# Patient Record
Sex: FEMALE | Race: WHITE | NOT HISPANIC OR LATINO | Employment: FULL TIME | URBAN - METROPOLITAN AREA
[De-identification: names, ages, dates, MRNs, and addresses within clinical notes are randomized per-mention and may not be internally consistent; named-entity substitution may affect disease eponyms.]

---

## 2022-01-08 ENCOUNTER — OFFICE VISIT (OUTPATIENT)
Dept: URGENT CARE | Facility: PHYSICIAN GROUP | Age: 52
End: 2022-01-08

## 2022-01-08 VITALS
OXYGEN SATURATION: 97 % | RESPIRATION RATE: 14 BRPM | HEART RATE: 64 BPM | HEIGHT: 62 IN | SYSTOLIC BLOOD PRESSURE: 112 MMHG | WEIGHT: 170 LBS | TEMPERATURE: 98.3 F | DIASTOLIC BLOOD PRESSURE: 88 MMHG | BODY MASS INDEX: 31.28 KG/M2

## 2022-01-08 DIAGNOSIS — G62.9 NEUROPATHY: ICD-10-CM

## 2022-01-08 PROCEDURE — 99203 OFFICE O/P NEW LOW 30 MIN: CPT | Performed by: FAMILY MEDICINE

## 2022-01-08 RX ORDER — GABAPENTIN 300 MG/1
300 CAPSULE ORAL 3 TIMES DAILY
Qty: 90 CAPSULE | Refills: 2 | Status: SHIPPED | OUTPATIENT
Start: 2022-01-08 | End: 2022-02-08

## 2022-01-08 ASSESSMENT — ENCOUNTER SYMPTOMS
NAUSEA: 0
WEIGHT LOSS: 0
EYE REDNESS: 0
EYE DISCHARGE: 0
VOMITING: 0
MYALGIAS: 0

## 2022-01-08 NOTE — PROGRESS NOTES
"Subjective     Bobbi Guerra is a 51 y.o. female who presents with Arm Pain (both arms, numbness, can not grasp things without having pain x 2 wks )            2 weeks burning pain bilateral upper extremity. Seems to be most prominent 1am to 6am. No change with head position. No fever. No PMH DM. No trauma. Pain is intermittent, 10/10 severity with duration approx 1 hour. No weakness. No EtOH. No relief with traditional [facility] medication. No other aggravating or alleviating factors.        Review of Systems   Constitutional: Negative for malaise/fatigue and weight loss.   Eyes: Negative for discharge and redness.   Gastrointestinal: Negative for nausea and vomiting.   Musculoskeletal: Negative for joint pain and myalgias.   Skin: Negative for itching and rash.              Objective     /88 (BP Location: Left arm, Patient Position: Sitting, BP Cuff Size: Adult)   Pulse 64   Temp 36.8 °C (98.3 °F) (Temporal)   Resp 14   Ht 1.575 m (5' 2\")   Wt 77.1 kg (170 lb)   SpO2 97%   BMI 31.09 kg/m²      Physical Exam  Constitutional:       General: She is not in acute distress.     Appearance: She is well-developed.   HENT:      Head: Normocephalic and atraumatic.   Eyes:      Conjunctiva/sclera: Conjunctivae normal.   Cardiovascular:      Rate and Rhythm: Normal rate and regular rhythm.      Heart sounds: Normal heart sounds. No murmur heard.      Pulmonary:      Effort: Pulmonary effort is normal.      Breath sounds: Normal breath sounds. No wheezing.   Skin:     General: Skin is warm and dry.      Findings: No rash.   Neurological:      General: No focal deficit present.      Mental Status: She is alert and oriented to person, place, and time.      Comments: Sensory to light and sharp intact bilateral upper extremity. Motor intact. Vascular intact. No change in radial pulse with shoulder extension/rotation.                              Assessment & Plan         1. Neuropathy  gabapentin (NEURONTIN) 300 " MG Cap    Basic Metabolic Panel    VITAMIN B12    TSH WITH REFLEX TO FT4     Differential diagnosis, natural history, supportive care, and indications for immediate follow-up discussed at length.     Unclear etiology, f/u studies.    F/u primary care

## 2022-01-24 ENCOUNTER — TELEPHONE (OUTPATIENT)
Dept: URGENT CARE | Facility: PHYSICIAN GROUP | Age: 52
End: 2022-01-24

## 2022-01-26 ENCOUNTER — TELEPHONE (OUTPATIENT)
Dept: URGENT CARE | Facility: PHYSICIAN GROUP | Age: 52
End: 2022-01-26

## 2022-01-26 NOTE — TELEPHONE ENCOUNTER
Patient called with results from [facility] labs.  Vitamin B12 within normal limits.  TSH within normal limits.  BMP within normal limits except for slightly low creatinine.    She notes that symptoms have been improving recently.  If they recur or worsen again she will follow-up with primary care.

## 2022-02-08 ENCOUNTER — OFFICE VISIT (OUTPATIENT)
Dept: INTERNAL MEDICINE | Facility: OTHER | Age: 52
End: 2022-02-08

## 2022-02-08 VITALS
HEART RATE: 69 BPM | DIASTOLIC BLOOD PRESSURE: 95 MMHG | TEMPERATURE: 97.4 F | OXYGEN SATURATION: 96 % | BODY MASS INDEX: 33.49 KG/M2 | WEIGHT: 177.4 LBS | SYSTOLIC BLOOD PRESSURE: 155 MMHG | HEIGHT: 61 IN

## 2022-02-08 DIAGNOSIS — I10 PRIMARY HYPERTENSION: ICD-10-CM

## 2022-02-08 DIAGNOSIS — E66.9 OBESITY (BMI 30-39.9): ICD-10-CM

## 2022-02-08 DIAGNOSIS — M25.641 STIFFNESS OF JOINTS OF BOTH HANDS: ICD-10-CM

## 2022-02-08 DIAGNOSIS — M79.10 MYALGIA: ICD-10-CM

## 2022-02-08 DIAGNOSIS — M25.642 STIFFNESS OF JOINTS OF BOTH HANDS: ICD-10-CM

## 2022-02-08 PROCEDURE — 99204 OFFICE O/P NEW MOD 45 MIN: CPT | Mod: GC | Performed by: STUDENT IN AN ORGANIZED HEALTH CARE EDUCATION/TRAINING PROGRAM

## 2022-02-08 ASSESSMENT — ENCOUNTER SYMPTOMS
VOMITING: 0
SHORTNESS OF BREATH: 0
PALPITATIONS: 0
MYALGIAS: 0
BACK PAIN: 0
BLOOD IN STOOL: 0
BLURRED VISION: 0
PHOTOPHOBIA: 0
SPUTUM PRODUCTION: 0
WEIGHT LOSS: 0
COUGH: 0
FOCAL WEAKNESS: 0
DOUBLE VISION: 0
TINGLING: 0
BRUISES/BLEEDS EASILY: 0
SORE THROAT: 0
DEPRESSION: 0
DIZZINESS: 0
FEVER: 0
HEMOPTYSIS: 0
SINUS PAIN: 0
CHILLS: 0
NECK PAIN: 0
ABDOMINAL PAIN: 0
NERVOUS/ANXIOUS: 0
ORTHOPNEA: 0
NAUSEA: 0
WEAKNESS: 0
HEARTBURN: 0
HEADACHES: 0

## 2022-02-08 ASSESSMENT — PATIENT HEALTH QUESTIONNAIRE - PHQ9: CLINICAL INTERPRETATION OF PHQ2 SCORE: 0

## 2022-02-08 ASSESSMENT — LIFESTYLE VARIABLES: SUBSTANCE_ABUSE: 0

## 2022-02-08 NOTE — PROGRESS NOTES
New Patient    Bobbi Guerra is a 51 y.o. female who presents today with the following:    CC: Establish Care with Primary Care Physician     HPI:    Patient here to establish care with me.  She has known hypertension diagnosed about a year ago in the [facility] currently on Ibersartan but unsure of dose.  She just moved to [place] in November 2021 from the [facility].  Patient was seen at an  last month for bilateral hand pain and stiffness she had a normal BMP, TSH, FT4 and B12 levels. She was prescribed Gabapentin but has not had any improvement in symptoms.  Patient stated that she started experiencing morning joint stiffness and  Hand pain for the past 2 months, never had this problem before, no history of RA in her family.  Stiffness worse in early mornings, affected joints usually proxinal interphalangeal, but not MCP joints, pain usually gets better after couple hours, she denied any swollen joints, fever, chills.  She has also had similar pain at night after she falls asleep, and some times has had bilateral upper arm myalgias, but not big joint pain or stiffness.  Patient has also taken Tylenol in the past 4 days with very slight improvement in pain.  She is up to date with immunizations, but is due for mammogram, Pap smear and Colonoscopy, unfortunately she does not have Insurance and can not afford getting above screening.  She was employed recently and will find out if she can get Insurance soon, she will let me know in her following appointment.  She was vaccinated for Covid in the [facility] and also [place] in the [place].      ROS:       Review of Systems   Constitutional: Negative for chills, fever, malaise/fatigue and weight loss.   HENT: Negative for congestion, hearing loss, sinus pain, sore throat and tinnitus.    Eyes: Negative for blurred vision, double vision and photophobia.   Respiratory: Negative for cough, hemoptysis, sputum production and shortness of breath.    Cardiovascular:  "Negative for chest pain, palpitations, orthopnea and leg swelling.   Gastrointestinal: Negative for abdominal pain, blood in stool, heartburn, melena, nausea and vomiting.   Genitourinary: Negative for dysuria, frequency, hematuria and urgency.   Musculoskeletal: Negative for back pain, joint pain, myalgias and neck pain.   Skin: Negative for rash.   Neurological: Negative for dizziness, tingling, focal weakness, weakness and headaches.   Endo/Heme/Allergies: Does not bruise/bleed easily.   Psychiatric/Behavioral: Negative for depression, substance abuse and suicidal ideas. The patient is not nervous/anxious.          Past Medical History:   Diagnosis Date   • Hypertension     Diagnosed in 2021       History reviewed. No pertinent surgical history.    Family History   Problem Relation Age of Onset   • Diabetes Father    • Heart Disease Father    • Hypertension Father    • Diabetes Sister    • Hypertension Sister    • Hypertension Brother    • Lung Cancer Paternal Uncle    • Diabetes Paternal Grandmother        Social History     Tobacco Use   • Smoking status: Never Smoker   • Smokeless tobacco: Never Used   Vaping Use   • Vaping Use: Never used   Substance Use Topics   • Alcohol use: Not Currently   • Drug use: Never       No current outpatient medications on file.     No current facility-administered medications for this visit.       Physical Exam:  /95 (BP Location: Left arm, Patient Position: Sitting, BP Cuff Size: Large adult)   Pulse 69   Temp 36.3 °C (97.4 °F) (Temporal)   Ht 1.549 m (5' 1\")   Wt 80.5 kg (177 lb 6.4 oz)   SpO2 96%   BMI 33.52 kg/m²      General: Well-developed, well-nourished female in no distress  HEENT: Conjuntiva and lids are within normal limits.  External auditory canals are within normal limits.  Tympanic membranes are clear with a normal light refflex.  Nasal mucosa is normal.  Oral pharynx is normal and free of exudate.  Neck: Supple, non-tender with nothyromegaly " noted.  Lympatic: Pre and Post auricular, sub-mandibular, anterior and posterior cervical and supraclavicular areas are without notable lymphadenopathy  Lungs: Clear to auscultation and perucssion bilaterally with good respiratory effort.  No wheezes, crackes or rhonci are heard.  Heart: Normal rate, regular rhythm with no murmurs, rubs or gallops heard.  Abdomen: Soft, non-tender, non-distended with normal-active bowel sounds.  Nor organomegaly noted.  Extremites: No edema  Psych: Patient is awake, alert and oriented with recent and remote memory intact. Mood and affect are normal.       Assessment and Plan:       1. Primary hypertension  - Diagnosed 1 year ago in the [facility] and taking Ibersartan unkown dose  - Today /95, she mentioned that she has taken her BP very sporadically in the past few months with similar BP readings.  - She had a normal BMP last month, normal Kidney function.       PLAN:  - Daily BP monitoring  - Bring BP log next visit  - Bring BP medication and BP log next visit  - Follow up in 1 month    2. Stiffness of joints of both hands  - Patient reports morning stiffness in both hands,  proximal interphalangeal joints for the past 2 months, no swollen joints, no Hx of RA in her family.  - Never experienced similar symptoms   - Seen for this at . Had some basic labs: BMP, TSH, FT4 and B12 all normal  - Was given Gabapentin with no improvement        PLAN:  - Stop Gabapentin  - Take Tylenol and Ibuprofen PRN  - RF, CPK, CBC  - Follow up in 1 month    3. Myalgia  - Has had sporadic muscle pain both arms, but no joint pain  - Get CPK  - Follow up in 1 month    4. Obesity (BMI 30-39.9)  - Advised about potential complications if not improvement in weight  (DM, HTN, Joint/back pain, etc)  - Weight loss  - Follow up in 1 month      Problem List Items Addressed This Visit     Primary hypertension    Obesity (BMI 30-39.9)      Other Visit Diagnoses     Stiffness of joints of both hands         Relevant Orders    RHEUMATOID ARTHRITIS FACTOR    CBC WITH DIFFERENTIAL    Myalgia        Relevant Orders    CREATINE KINASE          Return in about 4 weeks (around 3/8/2022).    There are no Patient Instructions on file for this visit.    Signed by: Edgardo Blanco M.D.      Dr. Francis MD, PGY2  [facility]

## 2022-02-08 NOTE — PATIENT INSTRUCTIONS
- Continue home BP med  - PRN Tylenol and Ibuprofen  - Monitor BP daily  - Bring BP log next visit  - Regular exercise, low salt diet, weight loss.  - Labs  - Follow up in 1 month

## 2022-03-02 DIAGNOSIS — M79.10 MYALGIA: ICD-10-CM

## 2022-03-02 DIAGNOSIS — M25.642 STIFFNESS OF JOINTS OF BOTH HANDS: ICD-10-CM

## 2022-03-02 DIAGNOSIS — M25.641 STIFFNESS OF JOINTS OF BOTH HANDS: ICD-10-CM

## 2022-07-01 ENCOUNTER — OFFICE VISIT (OUTPATIENT)
Dept: MEDICAL GROUP | Facility: CLINIC | Age: 52
End: 2022-07-01

## 2022-07-01 VITALS
SYSTOLIC BLOOD PRESSURE: 146 MMHG | OXYGEN SATURATION: 95 % | HEIGHT: 61 IN | TEMPERATURE: 97 F | DIASTOLIC BLOOD PRESSURE: 98 MMHG | BODY MASS INDEX: 32.85 KG/M2 | WEIGHT: 174 LBS | RESPIRATION RATE: 16 BRPM | HEART RATE: 85 BPM

## 2022-07-01 DIAGNOSIS — Z23 ENCOUNTER FOR VACCINATION: ICD-10-CM

## 2022-07-01 DIAGNOSIS — Z02.89 HISTORY AND PHYSICAL EXAMINATION, IMMIGRATION: ICD-10-CM

## 2022-07-01 PROCEDURE — 90472 IMMUNIZATION ADMIN EACH ADD: CPT | Performed by: FAMILY MEDICINE

## 2022-07-01 PROCEDURE — 90707 MMR VACCINE SC: CPT | Performed by: FAMILY MEDICINE

## 2022-07-01 PROCEDURE — 7101 PR PHYSICAL: Performed by: FAMILY MEDICINE

## 2022-07-01 PROCEDURE — 90715 TDAP VACCINE 7 YRS/> IM: CPT | Performed by: FAMILY MEDICINE

## 2022-07-01 PROCEDURE — 90471 IMMUNIZATION ADMIN: CPT | Performed by: FAMILY MEDICINE

## 2022-07-01 ASSESSMENT — PATIENT HEALTH QUESTIONNAIRE - PHQ9: CLINICAL INTERPRETATION OF PHQ2 SCORE: 0

## 2022-07-01 NOTE — PROGRESS NOTES
Here today for immigration exam and paperwork.  Denies significant PMH    Denies psychiatric hx, recent STI (2 years) or TB exposure.    HTN and DLD      Exam:    Gen: Well developed, well nourished in no acute distress.   Skin: Pink, warm, and dry  HEENT: conjunctiva non-injected, sclera non-icteric. EOMs intact.   Nasal mucosa without edema nor erythema.   Pinna normal.    Oral mucous membranes pink and moist with no lesions.  Neck: Supple, trachea midline. No adenopathy or masses in the neck or supraclavicular regions.  Lungs: Effort is normal. Clear to auscultation bilaterally with good excursion.  CV: regular rate and rhythm, no murmurs  Abdomen: soft, nontender, + BS. No HSM.  No CVAT  Ext: no edema, color normal, vascularity normal, temperature normal  Alert and oriented Eye contact is good, speech goal directed, affect calm    A/P  Immigration exam  Ordered appropriate labs (Quant TB, RPR, GC)  Immunizations:check varicella,   Given MMR and Tdap  Covid UTD

## 2022-07-25 ENCOUNTER — TELEPHONE (OUTPATIENT)
Dept: MEDICAL GROUP | Facility: CLINIC | Age: 52
End: 2022-07-25
Payer: COMMERCIAL

## 2022-08-01 ENCOUNTER — TELEPHONE (OUTPATIENT)
Dept: MEDICAL GROUP | Facility: CLINIC | Age: 52
End: 2022-08-01
Payer: COMMERCIAL

## 2022-08-01 NOTE — TELEPHONE ENCOUNTER
Unable to reach pt. I have left a message for her to call back and schedule an apt with Dr. Bain on 8/11 or 8/12 am while he is precepting for Immigration follow up.

## 2022-08-01 NOTE — TELEPHONE ENCOUNTER
Corrine' , Rangel would like a call back from Santana spann: rescheduling the patient's Immigration Appt with Dr Bain.

## 2022-08-12 ENCOUNTER — APPOINTMENT (OUTPATIENT)
Dept: RADIOLOGY | Facility: CLINIC | Age: 52
End: 2022-08-12
Attending: FAMILY MEDICINE
Payer: COMMERCIAL

## 2022-08-12 ENCOUNTER — OFFICE VISIT (OUTPATIENT)
Dept: MEDICAL GROUP | Facility: CLINIC | Age: 52
End: 2022-08-12
Payer: COMMERCIAL

## 2022-08-12 DIAGNOSIS — Z22.7 TB LUNG, LATENT: ICD-10-CM

## 2022-08-12 PROCEDURE — 99214 OFFICE O/P EST MOD 30 MIN: CPT | Performed by: FAMILY MEDICINE

## 2022-08-12 PROCEDURE — 71045 X-RAY EXAM CHEST 1 VIEW: CPT | Mod: TC | Performed by: FAMILY MEDICINE

## 2022-08-12 RX ORDER — IRBESARTAN 150 MG/1
150 TABLET ORAL 2 TIMES DAILY
COMMUNITY
End: 2022-09-21

## 2022-08-12 RX ORDER — PYRIDOXINE HCL (VITAMIN B6) 25 MG
25 TABLET ORAL DAILY
Qty: 30 TABLET | Refills: 2 | Status: SHIPPED | OUTPATIENT
Start: 2022-08-12 | End: 2022-09-21

## 2022-08-12 NOTE — PROGRESS NOTES
"Subjective:     Chief Complaint   Patient presents with    Paperwork     Immigration chest x ray      Corrine Bobbi Williamson is a 51 y.o. female here today for     Problem   TB Lung, Latent    Patient is here today to follow-up on positive quant gold TB test.  She is from the [facility] but denies any known exposure to tuberculosis.  She did have the BCG, but that should not affect the quant gold.  He has no symptoms of tuberculosis.          Current medicines (including changes today)  Current Outpatient Medications   Medication Sig Dispense Refill    irbesartan (AVAPRO) 150 MG Tab Take 150 mg by mouth 2 times a day.      isoniazid-rifampin (RIFAMATE) 150-300 MG per capsule Take 2 Capsules by mouth every day. 60 Capsule 2    pyridoxine (VITAMIN B-6) 25 MG Tab Take 1 Tablet by mouth every day. 30 Tablet 2     No current facility-administered medications for this visit.       Social History     Tobacco Use    Smoking status: Former    Smokeless tobacco: Never   Vaping Use    Vaping Use: Never used   Substance Use Topics    Alcohol use: Yes    Drug use: Never     History reviewed. No pertinent past medical history.   History reviewed. No pertinent family history.      Objective:     Vitals:    08/12/22 0804   Pulse: (P) 76   Temp: (P) 36.2 °C (97.2 °F)   TempSrc: (P) Temporal   SpO2: (P) 97%   Weight: (P) 80.7 kg (178 lb)   Height: (P) 1.549 m (5' 1\")     Body mass index is 33.63 kg/m² (pended).     Physical Exam:   Gen: Well developed, well nourished in no acute distress.   Skin: Pink, warm, and dry  HEENT: conjunctiva non-injected, sclera non-icteric. EOMs intact.   Nasal mucosa without edema nor erythema.   Pinna normal.    Oral mucous membranes pink and moist with no lesions.  Neck: Supple, trachea midline. No adenopathy or masses in the neck or supraclavicular regions.  Lungs: Effort is normal. Clear to auscultation bilaterally with good excursion.  CV: regular rate and rhythm, no murmurs  Abdomen: " soft, nontender, + BS. No HSM.  No CVAT  Ext: no edema, color normal, vascularity normal, temperature normal  Alert and oriented Eye contact is good, speech goal directed, affect calm    Assessment and Plan:   TB lung, latent  Is x-ray done in the office and reviewed by me today does not show any signs suggestive of active tuberculosis.  Reviewing recommendations from the CDC, up-to-date and discussed with the patient we will go with a 3R therapy with rifampin and INH,  Daily for 3 months.  Check a CMP to check liver function at this time.  I have warned her about risk of hepatitis to stop the medicine immediately if she has any signs of hepatitis, such as yellow skin yellow eyes dark urine.  She expresses understanding.  She states she will get the CMP before starting her medications.  I have asked her to follow-up with either myself or her primary care physician in 1 to 2 months just to make sure she is doing well.  We will proceed with patient paperwork.    Followup: No follow-ups on file.    Rakesh Bain M.D.

## 2022-08-12 NOTE — ASSESSMENT & PLAN NOTE
Is x-ray done in the office and reviewed by me today does not show any signs suggestive of active tuberculosis.  Reviewing recommendations from the CDC, up-to-date and discussed with the patient we will go with a 3R therapy with rifampin and INH,  Daily for 3 months.  Check a CMP to check liver function at this time.  I have warned her about risk of hepatitis to stop the medicine immediately if she has any signs of hepatitis, such as yellow skin yellow eyes dark urine.  She expresses understanding.  She states she will get the CMP before starting her medications.  I have asked her to follow-up with either myself or her primary care physician in 1 to 2 months just to make sure she is doing well.  We will proceed with patient paperwork.

## 2022-08-16 ENCOUNTER — TELEPHONE (OUTPATIENT)
Dept: MEDICAL GROUP | Facility: CLINIC | Age: 52
End: 2022-08-16
Payer: COMMERCIAL

## 2022-09-21 ENCOUNTER — OFFICE VISIT (OUTPATIENT)
Dept: MEDICAL GROUP | Facility: MEDICAL CENTER | Age: 52
End: 2022-09-21
Payer: COMMERCIAL

## 2022-09-21 VITALS
BODY MASS INDEX: 33.04 KG/M2 | HEIGHT: 61 IN | HEART RATE: 64 BPM | DIASTOLIC BLOOD PRESSURE: 76 MMHG | OXYGEN SATURATION: 97 % | WEIGHT: 175 LBS | SYSTOLIC BLOOD PRESSURE: 128 MMHG | RESPIRATION RATE: 16 BRPM | TEMPERATURE: 97.3 F

## 2022-09-21 DIAGNOSIS — E66.9 OBESITY (BMI 30-39.9): ICD-10-CM

## 2022-09-21 DIAGNOSIS — Z12.31 ENCOUNTER FOR SCREENING MAMMOGRAM FOR MALIGNANT NEOPLASM OF BREAST: ICD-10-CM

## 2022-09-21 DIAGNOSIS — Z22.7 TB LUNG, LATENT: ICD-10-CM

## 2022-09-21 DIAGNOSIS — Z13.6 SCREENING FOR CARDIOVASCULAR CONDITION: ICD-10-CM

## 2022-09-21 DIAGNOSIS — I10 PRIMARY HYPERTENSION: ICD-10-CM

## 2022-09-21 DIAGNOSIS — G56.03 BILATERAL CARPAL TUNNEL SYNDROME: ICD-10-CM

## 2022-09-21 DIAGNOSIS — Z23 NEED FOR VACCINATION: ICD-10-CM

## 2022-09-21 DIAGNOSIS — Z12.11 SCREEN FOR COLON CANCER: ICD-10-CM

## 2022-09-21 PROCEDURE — 90471 IMMUNIZATION ADMIN: CPT | Performed by: FAMILY MEDICINE

## 2022-09-21 PROCEDURE — 90472 IMMUNIZATION ADMIN EACH ADD: CPT | Performed by: FAMILY MEDICINE

## 2022-09-21 PROCEDURE — 90746 HEPB VACCINE 3 DOSE ADULT IM: CPT | Performed by: FAMILY MEDICINE

## 2022-09-21 PROCEDURE — 90686 IIV4 VACC NO PRSV 0.5 ML IM: CPT | Performed by: FAMILY MEDICINE

## 2022-09-21 PROCEDURE — 99214 OFFICE O/P EST MOD 30 MIN: CPT | Mod: 25 | Performed by: FAMILY MEDICINE

## 2022-09-21 RX ORDER — IRBESARTAN 150 MG/1
150 TABLET ORAL NIGHTLY
COMMUNITY
End: 2022-11-02 | Stop reason: SDUPTHER

## 2022-09-21 RX ORDER — RIFAMPIN 300 MG/1
600 CAPSULE ORAL DAILY
Qty: 60 CAPSULE | Refills: 3 | Status: SHIPPED | OUTPATIENT
Start: 2022-09-21 | End: 2022-10-19 | Stop reason: SDUPTHER

## 2022-09-21 NOTE — PROGRESS NOTES
"  Subjective:     Corrine Williamson is a 51 y.o. female presenting to [facility].  She has a history of hypertension that has been stable.  Is currently on irbesartan 150 mg daily.  Has been stable.  She also reports being diagnosed with latent tuberculosis last month, she has not started medications yet.  Denies any cough, fevers.  Chest x-ray and LFTs were normal last month.  She also reports bilateral burning and pain in her hands and arms at night.  She has been using braces with minimal improvement.  She is not interested in surgery at this time.        Current Outpatient Medications:     riFAMPin (RIFADINE) 300 MG Cap, Take 2 Capsules by mouth every day. Take with food but no fatty meal, Disp: 60 Capsule, Rfl: 3    irbesartan (AVAPRO) 150 MG Tab, Take 150 mg by mouth every evening., Disp: , Rfl:     Objective:     Vitals: /76   Pulse 64   Temp 36.3 °C (97.3 °F)   Resp 16   Ht 1.549 m (5' 1\")   Wt 79.4 kg (175 lb)   SpO2 97%   BMI 33.07 kg/m²   General: Alert  HEENT: Normocephalic.   Heart: Regular rate and rhythm.  S1 and S2 normal.  No murmurs appreciated.  Respiratory: Normal respiratory effort.  Clear to auscultation bilaterally.  Abdomen: Non-distended, soft  Extremities: No leg edema.    Assessment/Plan:     Corrine was seen today for [facility].    Diagnoses and all orders for this visit:    TB lung, latent  Undiagnosed new problem with uncertain prognosis.  Reviewed her previous labs, chest x-ray.  We will start rifampin 600 mg daily and check LFTs in 1 month.  We will plan for rifampin for 4 months, confirmed with the health department current treatment.  Follow-up in 2 months  -     CBC WITH DIFFERENTIAL; Future  -     Comp Metabolic Panel; Future  -     riFAMPin (RIFADINE) 300 MG Cap; Take 2 Capsules by mouth every day. Take with food but no fatty meal    Primary hypertension  Chronic, stable, continue irbesartan, she will let us know when she needs refills  -     " Comp Metabolic Panel; Future    Screening for cardiovascular condition  -     Lipid Profile; Future    Bilateral carpal tunnel syndrome  Chronic, worsening, will check a nerve conduction study.  We discussed wearing stiff wrist braces at night  -     Referral to Neurodiagnostics (EEG,EP,EMG/NCS/DBS)    Encounter for screening mammogram for malignant neoplasm of breast  -     MA-SCREENING MAMMO BILAT W/TOMOSYNTHESIS W/CAD; Future    Screen for colon cancer  -     Referral to GI for Colonoscopy    Need for vaccination  -     INFLUENZA VACCINE QUAD INJ (PF)  -     Hepatitis B Vaccine Adult IM    Obesity (BMI 30-39.9)  -     Patient identified as having weight management issue.  Appropriate orders and counseling given.        Return in about 2 months (around 11/21/2022) for Med check.

## 2022-10-19 DIAGNOSIS — Z22.7 TB LUNG, LATENT: ICD-10-CM

## 2022-10-19 RX ORDER — RIFAMPIN 300 MG/1
600 CAPSULE ORAL DAILY
Qty: 180 CAPSULE | Refills: 0 | Status: SHIPPED | OUTPATIENT
Start: 2022-10-19 | End: 2022-11-15 | Stop reason: SDUPTHER

## 2022-10-26 ENCOUNTER — NON-PROVIDER VISIT (OUTPATIENT)
Dept: NEUROLOGY | Facility: MEDICAL CENTER | Age: 52
End: 2022-10-26
Attending: PSYCHIATRY & NEUROLOGY
Payer: COMMERCIAL

## 2022-10-26 DIAGNOSIS — G56.03 BILATERAL CARPAL TUNNEL SYNDROME: ICD-10-CM

## 2022-10-26 PROCEDURE — 95912 NRV CNDJ TEST 11-12 STUDIES: CPT | Mod: 26 | Performed by: PSYCHIATRY & NEUROLOGY

## 2022-10-26 PROCEDURE — 95885 MUSC TST DONE W/NERV TST LIM: CPT | Mod: 26 | Performed by: PSYCHIATRY & NEUROLOGY

## 2022-10-26 PROCEDURE — 95885 MUSC TST DONE W/NERV TST LIM: CPT | Performed by: PSYCHIATRY & NEUROLOGY

## 2022-10-26 PROCEDURE — 95912 NRV CNDJ TEST 11-12 STUDIES: CPT | Performed by: PSYCHIATRY & NEUROLOGY

## 2022-10-26 NOTE — PROCEDURES
"NERVE CONDUCTION STUDIES AND ELECTROMYOGRAPHY REPORT  [facility]  10/26/22          IMPRESSION:  This is an abnormal study. There is electrophysiologic evidence of moderate to severe (primarily demyelinating) bilateral median neuropathy at the wrists (carpal tunnel syndrome). Clinical correlation recommended.       Ruthie Nino MD  Neurology - Neurophysiology          REASON FOR REFERRAL:  Ms. Corrine Guerra-Tia 52 y.o. referred by Dr. Janae Vega for an evaluation of burning pain in the bilateral hands. Symptoms have been present for 8 months and affect all fingers.     Height: 5'1\"  Weight: 175 lbs    Symptom focused neurological exam shows normal sensation in the 2nd and 3rd fingers. Otherwise normal strength in the bilateral proximal upper extremity.      ELECTRODIAGNOSTIC EXAMINATION:  Nerve conduction studies (NCS) and electromyography (EMG) are utilized to evaluate direct or indirect damage to the peripheral nervous system. NCS are performed to measure the nerve(s) response(s) to electrostimulation across a given nerve segment. EMG evaluates the passive and active electrical activity of the muscle(s) in question.  Muscles are innervated by specific peripheral nerves and roots. Often times, several nerves the muscle to be examined in order to determine the presence or absence of the disease process. Furthermore, nerves and muscles may need to be tested in a [id number] comparison, as well as in additional extremities, as this may be crucial in characterizing the extent of the disease process, which may be diffuse or isolated and of varying degree of severity. The extent of the neurodiagnostic exam is justified as it may help arrive to a proper diagnosis, which ultimately may contribute to better management of the patient. Therefore, the nerves to muscles examined during the study were medically necessary.    Unless otherwise noted, temperature of the extremity(s) " study was monitored before and during the examination and remained between 32 and 36 degrees C for the upper extremities, and between 30 and 36 degrees C for the lower extremities. The patient tolerated testing well, without any complications.       NERVE CONDUCTION STUDY SUMMARY:  Selected nerves of the bilateral upper extremity are studied.    Unobtainable bilateral median sensory responses.  Abnormal bilateral median motor responses at the APB due to prolonged distal latency and low amplitude on the left.   Unobtainable bilateral median responses with median/ulnar palmar comparison.   Normal bilateral ulnar sensory and motor responses.       NEEDLE EMG SUMMARY:  Concentric needle study of selected bilateral upper extremity muscles is performed.     Insertion activity is normal in all muscles sampled.   Reduced recruitment and activation in the left abductor pollicis brevis.   Otherwise with activation, there are normal morphology (amplitude/duration) motor unit action potentials firing with normal recruitment in remaining muscles tested.       PATIENT DATA TABLES  Nerve Conduction Studies     Stim Site NR Onset (ms) Norm Onset (ms) O-P Amp (µV) Norm O-P Amp Site1 Site2 Delta-P (ms) Dist (cm) Herman (m/s) Norm Herman (m/s)   Left Median Anti Sensory (2nd Digit)  35°C   Wrist *NR  <3.6  >10 Wrist 2nd Digit  14.0  >50   Right Median Anti Sensory (2nd Digit)  35°C   Wrist *NR  <3.6  >10 Wrist 2nd Digit  14.0  >50   Left Ulnar Anti Sensory (5th Digit)  35°C   Wrist    1.8 <3.1 55.4 >10 Wrist 5th Digit 2.5 14.0 56 >50   Right Ulnar Anti Sensory (5th Digit)  35°C   Wrist    1.6 <3.1 61.5 >10 Wrist 5th Digit 2.3 14.0 61 >50        Stim Site NR Onset (ms) Norm Onset (ms) O-P Amp (mV) Norm O-P Amp Site1 Site2 Delta-0 (ms) Dist (cm) Herman (m/s) Norm Herman (m/s)   Left Median Motor (Abd Poll Brev)  35°C   Wrist    *8.4 <4 *4.3 >6 Elbow Wrist 4.6 24.0 52 >50   Elbow    13.0  4.1          Right Median Motor (Abd Poll Brev)   Wrist     *8.5 <4 6.4 >6 Elbow Wrist 4.3 23.0 53 >50   Elbow    12.8  5.5          Left Ulnar Motor (Abd Dig Min)  35°C   Wrist    2.6 <3.1 11.5 >7 B Elbow Wrist 3.5 18.0 51 >50   B Elbow    6.1  11.2  A Elbow B Elbow 1.3 10.0 77    A Elbow    7.4  11.3          Right Ulnar Motor (Abd Dig Min)  35°C   Wrist    2.6 <3.1 11.6 >7 B Elbow Wrist 3.2 18.0 56 >50   B Elbow    5.8  11.3  A Elbow B Elbow 1.2 10.0 83    A Elbow    7.0  11.3               Stim Site NR Peak (ms) Norm Peak (ms) P-T Amp (µV) Site1 Site2 Delta-P (ms) Norm Delta (ms)   Left Median/Ulnar Palm Comparison (Wrist - 8cm)  35°C   Median Palm *NR  <2.3  Median Palm Ulnar Palm  <0.3   Ulnar Palm    1.5 <2.3 16.0       Right Median/Ulnar Palm Comparison (Wrist - 8cm)  35°C   Median Palm *NR  <2.3  Median Palm Ulnar Palm  <0.3   Ulnar Palm    1.2 <2.3 9.7                                   Electromyography     Side Muscle Nerve Root Ins Act Fibs Psw Amp Dur Poly Recrt Int Pat Comment   Left 1stDorInt Ulnar C8-T1 Nml Nml Nml Nml Nml 0 Nml Nml    Left Abd Poll Brev Median C8-T1 Nml Nml Nml Nml Nml 0 *Reduced *25%    Right Abd Poll Brev Median C8-T1 Nml Nml Nml Nml Nml 0 Nml Nml    Right 1stDorInt Ulnar C8-T1 Nml Nml Nml Nml Nml 0 Nml Nml

## 2022-10-29 ENCOUNTER — HOSPITAL ENCOUNTER (OUTPATIENT)
Dept: LAB | Facility: MEDICAL CENTER | Age: 52
End: 2022-10-29
Attending: FAMILY MEDICINE
Payer: COMMERCIAL

## 2022-10-29 DIAGNOSIS — Z22.7 TB LUNG, LATENT: ICD-10-CM

## 2022-10-29 DIAGNOSIS — Z13.6 SCREENING FOR CARDIOVASCULAR CONDITION: ICD-10-CM

## 2022-10-29 DIAGNOSIS — I10 PRIMARY HYPERTENSION: ICD-10-CM

## 2022-10-29 LAB
ALBUMIN SERPL BCP-MCNC: 4.7 G/DL (ref 3.2–4.9)
ALBUMIN/GLOB SERPL: 1.7 G/DL
ALP SERPL-CCNC: 67 U/L (ref 30–99)
ALT SERPL-CCNC: 22 U/L (ref 2–50)
ANION GAP SERPL CALC-SCNC: 10 MMOL/L (ref 7–16)
AST SERPL-CCNC: 18 U/L (ref 12–45)
BASOPHILS # BLD AUTO: 0.6 % (ref 0–1.8)
BASOPHILS # BLD: 0.04 K/UL (ref 0–0.12)
BILIRUB SERPL-MCNC: 0.3 MG/DL (ref 0.1–1.5)
BUN SERPL-MCNC: 14 MG/DL (ref 8–22)
CALCIUM SERPL-MCNC: 9.3 MG/DL (ref 8.5–10.5)
CHLORIDE SERPL-SCNC: 100 MMOL/L (ref 96–112)
CHOLEST SERPL-MCNC: 179 MG/DL (ref 100–199)
CO2 SERPL-SCNC: 27 MMOL/L (ref 20–33)
CREAT SERPL-MCNC: 0.48 MG/DL (ref 0.5–1.4)
EOSINOPHIL # BLD AUTO: 0.25 K/UL (ref 0–0.51)
EOSINOPHIL NFR BLD: 3.9 % (ref 0–6.9)
ERYTHROCYTE [DISTWIDTH] IN BLOOD BY AUTOMATED COUNT: 43.8 FL (ref 35.9–50)
FASTING STATUS PATIENT QL REPORTED: NORMAL
GFR SERPLBLD CREATININE-BSD FMLA CKD-EPI: 113 ML/MIN/1.73 M 2
GLOBULIN SER CALC-MCNC: 2.8 G/DL (ref 1.9–3.5)
GLUCOSE SERPL-MCNC: 104 MG/DL (ref 65–99)
HCT VFR BLD AUTO: 40.5 % (ref 37–47)
HDLC SERPL-MCNC: 46 MG/DL
HGB BLD-MCNC: 13.1 G/DL (ref 12–16)
IMM GRANULOCYTES # BLD AUTO: 0.01 K/UL (ref 0–0.11)
IMM GRANULOCYTES NFR BLD AUTO: 0.2 % (ref 0–0.9)
LDLC SERPL CALC-MCNC: 114 MG/DL
LYMPHOCYTES # BLD AUTO: 2.29 K/UL (ref 1–4.8)
LYMPHOCYTES NFR BLD: 36.2 % (ref 22–41)
MCH RBC QN AUTO: 29 PG (ref 27–33)
MCHC RBC AUTO-ENTMCNC: 32.3 G/DL (ref 33.6–35)
MCV RBC AUTO: 89.8 FL (ref 81.4–97.8)
MONOCYTES # BLD AUTO: 0.46 K/UL (ref 0–0.85)
MONOCYTES NFR BLD AUTO: 7.3 % (ref 0–13.4)
NEUTROPHILS # BLD AUTO: 3.28 K/UL (ref 2–7.15)
NEUTROPHILS NFR BLD: 51.8 % (ref 44–72)
NRBC # BLD AUTO: 0 K/UL
NRBC BLD-RTO: 0 /100 WBC
PLATELET # BLD AUTO: 300 K/UL (ref 164–446)
PMV BLD AUTO: 9.6 FL (ref 9–12.9)
POTASSIUM SERPL-SCNC: 3.9 MMOL/L (ref 3.6–5.5)
PROT SERPL-MCNC: 7.5 G/DL (ref 6–8.2)
RBC # BLD AUTO: 4.51 M/UL (ref 4.2–5.4)
SODIUM SERPL-SCNC: 137 MMOL/L (ref 135–145)
TRIGL SERPL-MCNC: 94 MG/DL (ref 0–149)
WBC # BLD AUTO: 6.3 K/UL (ref 4.8–10.8)

## 2022-10-29 PROCEDURE — 80053 COMPREHEN METABOLIC PANEL: CPT

## 2022-10-29 PROCEDURE — 85025 COMPLETE CBC W/AUTO DIFF WBC: CPT

## 2022-10-29 PROCEDURE — 36415 COLL VENOUS BLD VENIPUNCTURE: CPT

## 2022-10-29 PROCEDURE — 80061 LIPID PANEL: CPT

## 2022-10-31 PROBLEM — R73.01 IMPAIRED FASTING GLUCOSE: Status: ACTIVE | Noted: 2022-10-31

## 2022-10-31 PROBLEM — E78.5 HYPERLIPIDEMIA: Status: ACTIVE | Noted: 2022-10-31

## 2022-11-01 ENCOUNTER — PATIENT MESSAGE (OUTPATIENT)
Dept: MEDICAL GROUP | Facility: MEDICAL CENTER | Age: 52
End: 2022-11-01
Payer: COMMERCIAL

## 2022-11-02 RX ORDER — IRBESARTAN 150 MG/1
150 TABLET ORAL NIGHTLY
Qty: 90 TABLET | Refills: 3 | Status: SHIPPED | OUTPATIENT
Start: 2022-11-02 | End: 2022-12-27

## 2022-11-04 ENCOUNTER — HOSPITAL ENCOUNTER (OUTPATIENT)
Dept: RADIOLOGY | Facility: MEDICAL CENTER | Age: 52
End: 2022-11-04
Attending: FAMILY MEDICINE
Payer: COMMERCIAL

## 2022-11-04 DIAGNOSIS — Z12.31 ENCOUNTER FOR SCREENING MAMMOGRAM FOR MALIGNANT NEOPLASM OF BREAST: ICD-10-CM

## 2022-11-04 PROCEDURE — 77063 BREAST TOMOSYNTHESIS BI: CPT

## 2022-11-15 DIAGNOSIS — Z22.7 TB LUNG, LATENT: ICD-10-CM

## 2022-11-15 RX ORDER — RIFAMPIN 300 MG/1
600 CAPSULE ORAL DAILY
Qty: 180 CAPSULE | Refills: 0 | Status: SHIPPED | OUTPATIENT
Start: 2022-11-15 | End: 2023-01-30

## 2022-11-22 ENCOUNTER — OFFICE VISIT (OUTPATIENT)
Dept: MEDICAL GROUP | Facility: MEDICAL CENTER | Age: 52
End: 2022-11-22
Payer: COMMERCIAL

## 2022-11-22 VITALS
HEART RATE: 74 BPM | WEIGHT: 176 LBS | HEIGHT: 61 IN | TEMPERATURE: 97.6 F | OXYGEN SATURATION: 96 % | DIASTOLIC BLOOD PRESSURE: 70 MMHG | SYSTOLIC BLOOD PRESSURE: 122 MMHG | BODY MASS INDEX: 33.23 KG/M2 | RESPIRATION RATE: 16 BRPM

## 2022-11-22 DIAGNOSIS — Z22.7 TB LUNG, LATENT: ICD-10-CM

## 2022-11-22 DIAGNOSIS — E66.9 OBESITY (BMI 30-39.9): ICD-10-CM

## 2022-11-22 DIAGNOSIS — Z11.59 NEED FOR HEPATITIS C SCREENING TEST: ICD-10-CM

## 2022-11-22 DIAGNOSIS — Z23 NEED FOR VACCINATION: ICD-10-CM

## 2022-11-22 DIAGNOSIS — I10 PRIMARY HYPERTENSION: ICD-10-CM

## 2022-11-22 DIAGNOSIS — G56.03 BILATERAL CARPAL TUNNEL SYNDROME: ICD-10-CM

## 2022-11-22 DIAGNOSIS — R73.01 IMPAIRED FASTING GLUCOSE: ICD-10-CM

## 2022-11-22 PROCEDURE — 99214 OFFICE O/P EST MOD 30 MIN: CPT | Mod: 25 | Performed by: FAMILY MEDICINE

## 2022-11-22 PROCEDURE — 90471 IMMUNIZATION ADMIN: CPT | Performed by: FAMILY MEDICINE

## 2022-11-22 PROCEDURE — 90746 HEPB VACCINE 3 DOSE ADULT IM: CPT | Performed by: FAMILY MEDICINE

## 2022-11-22 ASSESSMENT — FIBROSIS 4 INDEX: FIB4 SCORE: 0.68

## 2022-11-22 NOTE — PROGRESS NOTES
"  Subjective:     Corrine Williamson is a 52 y.o. female presenting for a follow up          Current Outpatient Medications:     riFAMPin (RIFADINE) 300 MG Cap, Take 2 Capsules by mouth every day. Take with food but no fatty meal, Disp: 180 Capsule, Rfl: 0    irbesartan (AVAPRO) 150 MG Tab, Take 1 Tablet by mouth every evening., Disp: 90 Tablet, Rfl: 3    Objective:     Vitals: /70   Pulse 74   Temp 36.4 °C (97.6 °F)   Resp 16   Ht 1.549 m (5' 1\")   Wt 79.8 kg (176 lb)   SpO2 96%   BMI 33.25 kg/m²   General: Alert  HEENT: Normocephalic.  Heart: Regular rate and rhythm.  S1 and S2 normal.  No murmurs appreciated.  Respiratory: Normal respiratory effort.  Clear to auscultation bilaterally.  Abdomen: Non-distended, soft  Extremities: No leg edema.    Assessment/Plan:     Corrine was seen today for follow-up.    Diagnoses and all orders for this visit:    TB lung, latent  Chronic, stable, continue rifampin.  We discussed continuing this until the end of January.  We will recheck LFTs.  Follow-up in 2 months  -     HEPATIC FUNCTION PANEL; Future    Obesity (BMI 30-39.9)  Chronic, stable.  She is wonder about trying mounjaro.  We discussed a healthy diet, regular exercise for now.  We will consider trying this at her next visit    Impaired fasting glucose  Chronic, stable, continue to monitor.    Primary hypertension  Chronic, stable, continue irbesartan    Bilateral carpal tunnel syndrome  Chronic, worsening.  She reports using braces at night, but continues to have symptoms.  Referral to orthopedics placed  -     Referral to Orthopedics    Need for hepatitis C screening test  -     HEP C VIRUS ANTIBODY; Future    Need for vaccination  -     Hepatitis B Vaccine Adult IM        Return in about 2 months (around 1/22/2023) for Med check.      "

## 2022-11-26 ENCOUNTER — HOSPITAL ENCOUNTER (OUTPATIENT)
Dept: LAB | Facility: MEDICAL CENTER | Age: 52
End: 2022-11-26
Attending: FAMILY MEDICINE
Payer: COMMERCIAL

## 2022-11-26 LAB
ALBUMIN SERPL BCP-MCNC: 4.2 G/DL (ref 3.2–4.9)
ALP SERPL-CCNC: 66 U/L (ref 30–99)
ALT SERPL-CCNC: 29 U/L (ref 2–50)
AST SERPL-CCNC: 23 U/L (ref 12–45)
BILIRUB CONJ SERPL-MCNC: <0.2 MG/DL (ref 0.1–0.5)
BILIRUB INDIRECT SERPL-MCNC: NORMAL MG/DL (ref 0–1)
BILIRUB SERPL-MCNC: 0.3 MG/DL (ref 0.1–1.5)
HCV AB SER QL: NORMAL
PROT SERPL-MCNC: 7.4 G/DL (ref 6–8.2)

## 2022-11-26 PROCEDURE — 36415 COLL VENOUS BLD VENIPUNCTURE: CPT

## 2022-11-26 PROCEDURE — 80076 HEPATIC FUNCTION PANEL: CPT

## 2022-11-26 PROCEDURE — 86803 HEPATITIS C AB TEST: CPT

## 2022-12-27 PROBLEM — G56.01 RIGHT CARPAL TUNNEL SYNDROME: Status: ACTIVE | Noted: 2022-12-27

## 2022-12-27 PROBLEM — G56.02 CARPAL TUNNEL SYNDROME, LEFT: Status: ACTIVE | Noted: 2022-12-27

## 2023-01-30 ENCOUNTER — HOSPITAL ENCOUNTER (OUTPATIENT)
Dept: RADIOLOGY | Facility: MEDICAL CENTER | Age: 53
End: 2023-01-30
Attending: FAMILY MEDICINE
Payer: COMMERCIAL

## 2023-01-30 ENCOUNTER — HOSPITAL ENCOUNTER (OUTPATIENT)
Dept: LAB | Facility: MEDICAL CENTER | Age: 53
End: 2023-01-30
Attending: FAMILY MEDICINE
Payer: COMMERCIAL

## 2023-01-30 ENCOUNTER — OFFICE VISIT (OUTPATIENT)
Dept: MEDICAL GROUP | Facility: MEDICAL CENTER | Age: 53
End: 2023-01-30
Payer: COMMERCIAL

## 2023-01-30 VITALS
HEART RATE: 68 BPM | OXYGEN SATURATION: 96 % | HEIGHT: 62 IN | SYSTOLIC BLOOD PRESSURE: 122 MMHG | TEMPERATURE: 97.6 F | RESPIRATION RATE: 16 BRPM | DIASTOLIC BLOOD PRESSURE: 62 MMHG | WEIGHT: 169 LBS | BODY MASS INDEX: 31.1 KG/M2

## 2023-01-30 DIAGNOSIS — E66.9 OBESITY (BMI 30-39.9): ICD-10-CM

## 2023-01-30 DIAGNOSIS — R73.01 IMPAIRED FASTING GLUCOSE: ICD-10-CM

## 2023-01-30 DIAGNOSIS — Z22.7 TB LUNG, LATENT: ICD-10-CM

## 2023-01-30 DIAGNOSIS — I10 PRIMARY HYPERTENSION: ICD-10-CM

## 2023-01-30 LAB
ALBUMIN SERPL BCP-MCNC: 4.9 G/DL (ref 3.2–4.9)
ALP SERPL-CCNC: 70 U/L (ref 30–99)
ALT SERPL-CCNC: 24 U/L (ref 2–50)
AST SERPL-CCNC: 21 U/L (ref 12–45)
BILIRUB CONJ SERPL-MCNC: <0.2 MG/DL (ref 0.1–0.5)
BILIRUB INDIRECT SERPL-MCNC: NORMAL MG/DL (ref 0–1)
BILIRUB SERPL-MCNC: 0.4 MG/DL (ref 0.1–1.5)
EST. AVERAGE GLUCOSE BLD GHB EST-MCNC: 117 MG/DL
HBA1C MFR BLD: 5.7 % (ref 4–5.6)
PROT SERPL-MCNC: 7.8 G/DL (ref 6–8.2)

## 2023-01-30 PROCEDURE — 80076 HEPATIC FUNCTION PANEL: CPT

## 2023-01-30 PROCEDURE — 99214 OFFICE O/P EST MOD 30 MIN: CPT | Performed by: FAMILY MEDICINE

## 2023-01-30 PROCEDURE — 36415 COLL VENOUS BLD VENIPUNCTURE: CPT

## 2023-01-30 PROCEDURE — 83036 HEMOGLOBIN GLYCOSYLATED A1C: CPT

## 2023-01-30 PROCEDURE — 71046 X-RAY EXAM CHEST 2 VIEWS: CPT

## 2023-01-30 ASSESSMENT — PATIENT HEALTH QUESTIONNAIRE - PHQ9: CLINICAL INTERPRETATION OF PHQ2 SCORE: 0

## 2023-01-30 ASSESSMENT — FIBROSIS 4 INDEX: FIB4 SCORE: 0.75

## 2023-01-30 NOTE — PROGRESS NOTES
"  Subjective:     Corrine Guerra-Tia is a 52 y.o. female presenting for a follow-up.  She has completed 4 months of rifampin for her latent TB.  She overall feels well.  Denies any cough.  Her blood pressures at home have been elevated intermittently.  We discussed taking her irbesartan in the evenings and bring in her blood pressure monitor so that we can check it.  She has also lost about 7 pounds since her last visit.  Has been eating healthier.      Current Outpatient Medications:     irbesartan (AVAPRO) 150 MG Tab, Take 150 mg by mouth every evening., Disp: , Rfl:     Objective:     Vitals: /62   Pulse 68   Temp 36.4 °C (97.6 °F)   Resp 16   Ht 1.575 m (5' 2\")   Wt 76.7 kg (169 lb)   SpO2 96%   BMI 30.91 kg/m²   General: Alert  HEENT: Normocephalic.   Heart: Regular rate and rhythm.  S1 and S2 normal.  No murmurs appreciated.  Respiratory: Normal respiratory effort.  Clear to auscultation bilaterally.  Abdomen: Non-distended, soft  Extremities: No leg edema.    Assessment/Plan:     Corrine was seen today for follow-up.    Diagnoses and all orders for this visit:    TB lung, latent  Chronic, stable.  She has completed rifampin treatment.  We will recheck LFTs, chest x-ray.  -     HEPATIC FUNCTION PANEL; Future  -     DX-CHEST-2 VIEWS; Future    Impaired fasting glucose  Chronic, stable, continue to monitor  -     HEMOGLOBIN A1C; Future    Primary hypertension  Chronic, potentially stable.  We discussed taking her irbesartan in the evenings, follow-up in 3 months    Obesity (BMI 30-39.9)  -     Patient identified as having weight management issue.  Appropriate orders and counseling given.          Return in about 3 months (around 4/30/2023) for pap, Preventive/annual physical.      "

## 2023-04-25 ENCOUNTER — HOSPITAL ENCOUNTER (OUTPATIENT)
Facility: MEDICAL CENTER | Age: 53
End: 2023-04-25
Attending: FAMILY MEDICINE
Payer: COMMERCIAL

## 2023-04-25 ENCOUNTER — OFFICE VISIT (OUTPATIENT)
Dept: MEDICAL GROUP | Facility: MEDICAL CENTER | Age: 53
End: 2023-04-25
Payer: COMMERCIAL

## 2023-04-25 VITALS
DIASTOLIC BLOOD PRESSURE: 72 MMHG | HEIGHT: 61 IN | TEMPERATURE: 97.3 F | RESPIRATION RATE: 16 BRPM | OXYGEN SATURATION: 97 % | WEIGHT: 173 LBS | SYSTOLIC BLOOD PRESSURE: 120 MMHG | HEART RATE: 72 BPM | BODY MASS INDEX: 32.66 KG/M2

## 2023-04-25 DIAGNOSIS — E66.9 OBESITY (BMI 30-39.9): ICD-10-CM

## 2023-04-25 DIAGNOSIS — Z11.51 SCREENING FOR HPV (HUMAN PAPILLOMAVIRUS): ICD-10-CM

## 2023-04-25 DIAGNOSIS — Z23 NEED FOR VACCINATION: ICD-10-CM

## 2023-04-25 DIAGNOSIS — Z12.4 SCREENING FOR CERVICAL CANCER: ICD-10-CM

## 2023-04-25 DIAGNOSIS — Z01.419 WELL FEMALE EXAM WITH ROUTINE GYNECOLOGICAL EXAM: ICD-10-CM

## 2023-04-25 PROCEDURE — 99396 PREV VISIT EST AGE 40-64: CPT | Mod: 25 | Performed by: FAMILY MEDICINE

## 2023-04-25 PROCEDURE — 90746 HEPB VACCINE 3 DOSE ADULT IM: CPT | Performed by: FAMILY MEDICINE

## 2023-04-25 PROCEDURE — 88175 CYTOPATH C/V AUTO FLUID REDO: CPT

## 2023-04-25 PROCEDURE — 87624 HPV HI-RISK TYP POOLED RSLT: CPT

## 2023-04-25 PROCEDURE — 90471 IMMUNIZATION ADMIN: CPT | Performed by: FAMILY MEDICINE

## 2023-04-25 ASSESSMENT — FIBROSIS 4 INDEX: FIB4 SCORE: 0.76

## 2023-04-25 NOTE — PROGRESS NOTES
cc: well exam    Subjective:     Corrine Guerra is a 52 y.o. female presents for a routine preventive health exam.    Ob-Gyn/ History:    /Para:    Last Pap Smear:  many years ago. no history of abnormal pap smears.  Gyn Surgery:  none.  Periods irregular  Urinary incontinence: none    Screening/Preventative Topics:  Advanced directive: handout given   Osteoporosis Screen/ DEXA: n/a   PT/vit D for falls prevention: n/a   Cholesterol Screening: 10/2023   Diabetes Screenin2023   AAA Screening: n/a   Aspirin Use: n/a  The 10-year ASCVD risk score (Go BUTTS, et al., 2019) is: 2.1%  Diet: discussed   Exercise: discussed   Screen for depression: PHQ-2: 0  Substance Abuse: none   Seat belts, bike helmet, gun safety discussed.   Sun protection used.    Cancer screening  Colorectal Cancer Screenin2022     Lung Cancer Screening: n/a    Cervical Cancer Screening: collected today    Breast Cancer Screenin2022     Infectious disease screening  --STI Screening: n/a   --Practices safe sex.  --HIV Screening: will order at next lab draw   --Syphilis Screening: n/a   --Hepatitis C Screening: negative 2022   --Latent TB Screening: n/a     Immunizations:   Influenza: n/a  HPV: n/a  Hepatitis B: given today  Tetanus: 2022  Shingles: not available  Pneumococcal: n/a      Review of systems:  Denies any weight loss, fevers, fatigue, vision changes, sore throat, swollen glands, rashes, shortness of breath, chest pain, numbness, nausea, vomiting, diarrhea, constipation, abdominal pain, dysuria, hematuria, joint pain, muscle weakness, depression.    Current Outpatient Medications:     irbesartan (AVAPRO) 150 MG Tab, Take 150 mg by mouth every evening., Disp: , Rfl:     Allergies   Allergen Reactions    Aspirin        History reviewed. No pertinent past medical history.  Past Surgical History:   Procedure Laterality Date    MT NEUROPLASTY & OR TRANSPOS MEDIAN NRV CARPAL REBEKA Left 2023  "   Procedure: LEFT HAND OPEN CARPAL TUNNEL RELEASE;  Surgeon: Elsie Michelle M.D.;  Location: [facility] [place];  Service: Orthopedics     Family History   Problem Relation Age of Onset    Diabetes Father     Diabetes Sister      Social History     Socioeconomic History    Marital status:      Spouse name: Not on file    Number of children: Not on file    Years of education: Not on file    Highest education level: Not on file   Occupational History    Not on file   Tobacco Use    Smoking status: Former    Smokeless tobacco: Never   Vaping Use    Vaping Use: Never used   Substance and Sexual Activity    Alcohol use: Yes    Drug use: Never    Sexual activity: Not on file   Other Topics Concern    Not on file   Social History Narrative    Not on file     Social Determinants of Health     Financial Resource Strain: Not on file   Food Insecurity: Not on file   Transportation Needs: Not on file   Physical Activity: Not on file   Stress: Not on file   Social Connections: Not on file   Intimate Partner Violence: Not on file   Housing Stability: Not on file       Objective:     Vitals: /72   Pulse 72   Temp 36.3 °C (97.3 °F)   Resp 16   Ht 1.549 m (5' 1\")   Wt 78.5 kg (173 lb)   LMP  (LMP Unknown)   SpO2 97%   BMI 32.69 kg/m²   General: Alert, pleasant, NAD  HEENT:  Normocephalic.  PERRL, EOMI, no icterus or pallor.  Conjunctivae and lids normal.  External ears normal. Tympanic membranes pearly, opaque.  Nares patent, mucosa pink.  Oropharynx non-erythematous, mucous membranes moist.  Neck supple.  No thyromegaly or masses palpated. No cervical or supraclavicular lymphadenopathy.  Heart:  Regular rate and rhythm.  S1 and S2 normal.  No murmurs appreciated.  Respiratory:  Normal respiratory effort.  Clear to auscultation bilaterally.  Abdomen:  Bowel sounds present.  Non-distended, soft, non-tender, no guarding/rebound. No hepatosplenomegaly.  No hernias.  Pelvic exam: Normal " external genitalia including urethral meatus.  Well supported, nontender urethra and bladder.  Vagina and cervix without significant discharge or lesions.  No cervical motion tenderness.  Retroflexed uterus of normal size, shape, and consistency.  No adnexal masses or tenderness.     Rectal:  Normal external anus, no hemorrhoids.    Skin:  Warm, dry, no rashes  Musculoskeletal:  Gait is normal.  Moves all extremities well.  Extremities: No leg edema.  Neurological: No tremors, sensation grossly intact, patellar and biceps reflexes 2+ symmetric, tone/strength normal  Psych:  Affect/mood is normal, judgement is good, memory is intact, grooming is appropriate.    A chaperone was offered to the patient during today's exam. Chaperone name: Tanja was present.        Assessment/Plan:     Diagnoses and all orders for this visit:    Well female exam with routine gynecological exam  Screening for cervical cancer  Screening for HPV (human papillomavirus)  Patient Counseling:  --Discussed moderation in sodium/caffeine intake, saturated fat and cholesterol, caloric balance, sufficient fresh fruits/vegetables, fiber, iron  --Discussed brushing, flossing, and dental visits.   --Encouraged regular exercise.   --Discussed tobacco, alcohol, or other drug use; availability of treatment for abuse.   --Injury prevention: Discussed safety belts, safety helmets, smoke detector, etc.  -     THINPREP PAP WITH HPV; Future    Need for vaccination  -     Hepatitis B Vaccine Adult IM    Obesity (BMI 30-39.9)        Preventive visit in 1 year, sooner as needed for any concerns.

## 2023-04-26 DIAGNOSIS — Z12.4 SCREENING FOR CERVICAL CANCER: ICD-10-CM

## 2023-04-26 DIAGNOSIS — Z01.419 WELL FEMALE EXAM WITH ROUTINE GYNECOLOGICAL EXAM: ICD-10-CM

## 2023-04-26 DIAGNOSIS — Z11.51 SCREENING FOR HPV (HUMAN PAPILLOMAVIRUS): ICD-10-CM

## 2023-04-27 LAB
CYTOLOGY REG CYTOL: NORMAL
HPV HR 12 DNA CVX QL NAA+PROBE: NEGATIVE
HPV16 DNA SPEC QL NAA+PROBE: NEGATIVE
HPV18 DNA SPEC QL NAA+PROBE: NEGATIVE
SPECIMEN SOURCE: NORMAL

## 2023-09-29 ENCOUNTER — OFFICE VISIT (OUTPATIENT)
Dept: MEDICAL GROUP | Facility: MEDICAL CENTER | Age: 53
End: 2023-09-29
Payer: COMMERCIAL

## 2023-09-29 VITALS
RESPIRATION RATE: 16 BRPM | SYSTOLIC BLOOD PRESSURE: 124 MMHG | HEIGHT: 61 IN | BODY MASS INDEX: 33.99 KG/M2 | OXYGEN SATURATION: 94 % | WEIGHT: 180 LBS | TEMPERATURE: 97.6 F | HEART RATE: 68 BPM | DIASTOLIC BLOOD PRESSURE: 70 MMHG

## 2023-09-29 DIAGNOSIS — Z23 NEED FOR VACCINATION: ICD-10-CM

## 2023-09-29 DIAGNOSIS — R73.01 IMPAIRED FASTING GLUCOSE: ICD-10-CM

## 2023-09-29 DIAGNOSIS — E78.5 HYPERLIPIDEMIA, UNSPECIFIED HYPERLIPIDEMIA TYPE: ICD-10-CM

## 2023-09-29 DIAGNOSIS — I10 PRIMARY HYPERTENSION: ICD-10-CM

## 2023-09-29 DIAGNOSIS — R51.9 CHRONIC NONINTRACTABLE HEADACHE, UNSPECIFIED HEADACHE TYPE: ICD-10-CM

## 2023-09-29 DIAGNOSIS — G89.29 CHRONIC NONINTRACTABLE HEADACHE, UNSPECIFIED HEADACHE TYPE: ICD-10-CM

## 2023-09-29 PROBLEM — G56.01 RIGHT CARPAL TUNNEL SYNDROME: Status: RESOLVED | Noted: 2022-12-27 | Resolved: 2023-09-29

## 2023-09-29 PROBLEM — G56.02 CARPAL TUNNEL SYNDROME, LEFT: Status: RESOLVED | Noted: 2022-12-27 | Resolved: 2023-09-29

## 2023-09-29 PROBLEM — E66.9 OBESITY (BMI 30-39.9): Status: RESOLVED | Noted: 2022-02-08 | Resolved: 2023-09-29

## 2023-09-29 PROBLEM — G56.03 BILATERAL CARPAL TUNNEL SYNDROME: Status: RESOLVED | Noted: 2022-09-21 | Resolved: 2023-09-29

## 2023-09-29 RX ORDER — IRBESARTAN 150 MG/1
150 TABLET ORAL NIGHTLY
Qty: 90 TABLET | Refills: 3 | Status: SHIPPED | OUTPATIENT
Start: 2023-09-29

## 2023-09-29 ASSESSMENT — FIBROSIS 4 INDEX: FIB4 SCORE: 0.76

## 2023-09-29 NOTE — PROGRESS NOTES
"  Subjective:     Corrine Williamson is a 52 y.o. female presenting with her  for a follow-up        Current Outpatient Medications:     irbesartan (AVAPRO) 150 MG Tab, Take 1 Tablet by mouth every evening., Disp: 90 Tablet, Rfl: 3    Objective:     Vitals: /70   Pulse 68   Temp 36.4 °C (97.6 °F)   Resp 16   Ht 1.549 m (5' 1\")   Wt 81.6 kg (180 lb)   LMP  (LMP Unknown)   SpO2 94%   BMI 34.01 kg/m²   General: Alert  HEENT: Normocephalic.     Assessment/Plan:     Diagnoses and all orders for this visit:    Primary hypertension   chronic, stable, blood pressures remain well controlled.  Continue irbesartan  -     irbesartan (AVAPRO) 150 MG Tab; Take 1 Tablet by mouth every evening.    Impaired fasting glucose  Chronic, stable, discussed regular monitoring, healthy diet, regular exercise.  Weight has been gradually increasing, we discussed monitoring this.    Hyperlipidemia, unspecified hyperlipidemia type  Chronic, stable, continue to monitor.  The 10-year ASCVD risk score (Go DK, et al., 2019) is: 2.2%    Chronic nonintractable headache, unspecified headache type  Chronic, stable, she would like to see her 's neurologist as well.  Referral placed  -     Referral to Neurology    Need for vaccination  -     INFLUENZA VACCINE QUAD INJ (PF)      "

## 2023-10-06 ENCOUNTER — DOCUMENTATION (OUTPATIENT)
Dept: HEALTH INFORMATION MANAGEMENT | Facility: OTHER | Age: 53
End: 2023-10-06
Payer: COMMERCIAL

## 2023-10-12 ENCOUNTER — TELEPHONE (OUTPATIENT)
Dept: HEALTH INFORMATION MANAGEMENT | Facility: OTHER | Age: 53
End: 2023-10-12
Payer: COMMERCIAL

## 2023-11-05 ENCOUNTER — OFFICE VISIT (OUTPATIENT)
Dept: URGENT CARE | Facility: PHYSICIAN GROUP | Age: 53
End: 2023-11-05
Payer: COMMERCIAL

## 2023-11-05 VITALS
TEMPERATURE: 97.5 F | OXYGEN SATURATION: 97 % | HEART RATE: 79 BPM | HEIGHT: 62 IN | SYSTOLIC BLOOD PRESSURE: 132 MMHG | DIASTOLIC BLOOD PRESSURE: 86 MMHG | WEIGHT: 183 LBS | BODY MASS INDEX: 33.68 KG/M2 | RESPIRATION RATE: 15 BRPM

## 2023-11-05 DIAGNOSIS — U07.1 COVID-19: ICD-10-CM

## 2023-11-05 LAB
FLUAV RNA SPEC QL NAA+PROBE: NEGATIVE
FLUBV RNA SPEC QL NAA+PROBE: NEGATIVE
RSV RNA SPEC QL NAA+PROBE: NEGATIVE
SARS-COV-2 RNA RESP QL NAA+PROBE: POSITIVE

## 2023-11-05 PROCEDURE — 0241U POCT CEPHEID COV-2, FLU A/B, RSV - PCR: CPT | Performed by: NURSE PRACTITIONER

## 2023-11-05 PROCEDURE — 99213 OFFICE O/P EST LOW 20 MIN: CPT | Mod: CS | Performed by: NURSE PRACTITIONER

## 2023-11-05 PROCEDURE — 3075F SYST BP GE 130 - 139MM HG: CPT | Performed by: NURSE PRACTITIONER

## 2023-11-05 PROCEDURE — 3079F DIAST BP 80-89 MM HG: CPT | Performed by: NURSE PRACTITIONER

## 2023-11-05 ASSESSMENT — ENCOUNTER SYMPTOMS
SHORTNESS OF BREATH: 0
SORE THROAT: 1
DIARRHEA: 0
HEADACHES: 1
VOMITING: 0
COUGH: 1
WHEEZING: 0
FEVER: 0

## 2023-11-05 ASSESSMENT — FIBROSIS 4 INDEX: FIB4 SCORE: 0.77

## 2023-11-05 NOTE — PROGRESS NOTES
Subjective:     Corrine Williamson is a 53 y.o. female who presents for Sore Throat (Today Headache, cough , pt  tested positive for COVID on Friday )       tested positive for COVID on Friday.     Cough  This is a new problem. The current episode started yesterday. Associated symptoms include headaches and a sore throat. Pertinent negatives include no fever, shortness of breath or wheezing.       Past Medical History:   Diagnosis Date    Hypertension     Diagnosed in 2021       Past Surgical History:   Procedure Laterality Date    ND NEUROPLASTY & OR TRANSPOS MEDIAN NRV CARPAL REBEKA Right 6/30/2023    Procedure: RIGHT HAND OPEN CARPAL TUNNEL RELEASE;  Surgeon: Elsie Michelle M.D.;  Location: [facility];  Service: Orthopedics    ND NEUROPLASTY & OR TRANSPOS MEDIAN NRV CARPAL REBEKA Left 4/7/2023    Procedure: LEFT HAND OPEN CARPAL TUNNEL RELEASE;  Surgeon: Elsie Michelle M.D.;  Location: [facility];  Service: Orthopedics       Social History     Socioeconomic History    Marital status:      Spouse name: Not on file    Number of children: Not on file    Years of education: Not on file    Highest education level: Not on file   Occupational History    Not on file   Tobacco Use    Smoking status: Former    Smokeless tobacco: Never   Vaping Use    Vaping Use: Never used   Substance and Sexual Activity    Alcohol use: Not Currently    Drug use: Never    Sexual activity: Not Currently   Other Topics Concern    Not on file   Social History Narrative    ** Merged History Encounter **          Social Determinants of Health     Financial Resource Strain: Not on file   Food Insecurity: Not on file   Transportation Needs: Not on file   Physical Activity: Not on file   Stress: Not on file   Social Connections: Not on file   Intimate Partner Violence: Not on file   Housing Stability: Not on file        Family History   Problem  "Relation Age of Onset    Diabetes Father     Diabetes Sister     Heart Disease Father     Hypertension Father     Diabetes Sister     Hypertension Sister     Hypertension Brother     Lung Cancer Paternal Uncle     Diabetes Paternal Grandmother         Allergies   Allergen Reactions    Aspirin     Aspirin        Review of Systems   Constitutional:  Negative for fever.   HENT:  Positive for sore throat.    Respiratory:  Positive for cough. Negative for shortness of breath and wheezing.    Gastrointestinal:  Negative for diarrhea and vomiting.   Neurological:  Positive for headaches.   All other systems reviewed and are negative.       Objective:   /86   Pulse 79   Temp 36.4 °C (97.5 °F) (Temporal)   Resp 15   Ht 1.575 m (5' 2\")   Wt 83 kg (183 lb)   LMP  (LMP Unknown)   SpO2 97%   BMI 33.47 kg/m²     Physical Exam  Vitals reviewed.   Constitutional:       General: She is not in acute distress.     Appearance: She is well-developed. She is not toxic-appearing.   HENT:      Head: Normocephalic and atraumatic.      Right Ear: Tympanic membrane and external ear normal.      Left Ear: Tympanic membrane and external ear normal.      Nose: Mucosal edema present.      Mouth/Throat:      Mouth: Mucous membranes are moist.      Pharynx: Posterior oropharyngeal erythema present.   Eyes:      Conjunctiva/sclera: Conjunctivae normal.   Cardiovascular:      Rate and Rhythm: Normal rate.   Pulmonary:      Effort: Pulmonary effort is normal. No respiratory distress.      Breath sounds: Normal breath sounds. No stridor. No wheezing, rhonchi or rales.   Skin:     General: Skin is warm and dry.      Findings: No rash.   Neurological:      Mental Status: She is alert and oriented to person, place, and time.      GCS: GCS eye subscore is 4. GCS verbal subscore is 5. GCS motor subscore is 6.   Psychiatric:         Speech: Speech normal.         Behavior: Behavior normal.         Thought Content: Thought content normal.        "  Judgment: Judgment normal.         Assessment/Plan:   1. COVID-19  - POCT CEPHEID COV-2, FLU A/B, RSV - PCR  - Nirmatrelvir&Ritonavir 300/100 20 x 150 MG & 10 x 100MG Tablet Therapy Pack; Take 300 mg nirmatrelvir (two 150 mg tablets) with 100 mg ritonavir (one 100 mg tablet) by mouth, with all three tablets taken together twice daily for 5 days.  Dispense: 30 Each; Refill: 0    Results for orders placed or performed in visit on 11/05/23   POCT CEPHEID COV-2, FLU A/B, RSV - PCR   Result Value Ref Range    SARS-CoV-2 by PCR Positive (A) Negative, Invalid    Influenza virus A RNA Negative Negative, Invalid    Influenza virus B, PCR Negative Negative, Invalid    RSV, PCR Negative Negative, Invalid   Symptomatic care.  -Oral hydration and rest.   -Cough control: nonpharmacologic options for cough relief such as throat lozenges, hot tea, honey.  -Over the counter expectorant as directed; Guaifenesin (Mucinex).  -Tylenol for pain and fever as directed.   -Warm salt water gargles.  -OTC Throat lozenges or spray (Cepacol).    Seek emergency medical care immediately for: Trouble breathing, persistent pain or pressure in the chest, confusion, inability to wake or stay awake, bluish lips or face, persistent tachycardia (fast heart rate), prolonged dizziness, persistent high grade fevers. Follow up for prolonged cough, persistent wheezing, persistent throat pain, difficulty swallowing, persistent fevers, leg swelling, or any other concerns. Follow up with your Primary Care Provider.     -Discussed viral etiology. COVID S&S, and self isolation guidelines. S&S of PNA with follow up. Stable Vitals. Review labs, GFR > 100. No hx of renal insufficiency.     Differential diagnosis, natural history, supportive care, and indications for immediate follow-up discussed.

## 2023-11-05 NOTE — LETTER
November 6, 2023         Patient: Corrine Williamson   YOB: 1970   Date of Visit: 11/5/2023           To Whom it May Concern:    Corrine Williamson was seen in my clinic on 11/5/2023.     We are asking employers to excuse absences while they follow self-isolation protocol per CDC guidelines.     Stay home for 5 days.  If you have no symptoms or your symptoms are resolving after 5 days, you can leave your house.  Continue to wear a mask around others for 5 additional days.  If you have a fever, continue to stay home until your fever resolves.     Please schedule a visit with a primary care provider if documents for FMLA, disability, or unemployment are required.\    If you have any questions or concerns, please don't hesitate to call.        Sincerely,           RUSH Quinn.  Electronically Signed

## 2023-11-06 ENCOUNTER — TELEPHONE (OUTPATIENT)
Dept: URGENT CARE | Facility: PHYSICIAN GROUP | Age: 53
End: 2023-11-06
Payer: COMMERCIAL

## 2023-11-13 ENCOUNTER — OFFICE VISIT (OUTPATIENT)
Dept: NEUROLOGY | Facility: MEDICAL CENTER | Age: 53
End: 2023-11-13
Attending: NURSE PRACTITIONER
Payer: COMMERCIAL

## 2023-11-13 VITALS
HEART RATE: 89 BPM | SYSTOLIC BLOOD PRESSURE: 118 MMHG | HEIGHT: 62 IN | WEIGHT: 185.63 LBS | TEMPERATURE: 98.3 F | DIASTOLIC BLOOD PRESSURE: 76 MMHG | OXYGEN SATURATION: 95 % | BODY MASS INDEX: 34.16 KG/M2

## 2023-11-13 DIAGNOSIS — G43.009 MIGRAINE WITHOUT AURA AND WITHOUT STATUS MIGRAINOSUS, NOT INTRACTABLE: ICD-10-CM

## 2023-11-13 DIAGNOSIS — G43.C0 PERIODIC HEADACHE SYNDROME, NOT INTRACTABLE: ICD-10-CM

## 2023-11-13 PROCEDURE — 3074F SYST BP LT 130 MM HG: CPT | Performed by: NURSE PRACTITIONER

## 2023-11-13 PROCEDURE — 3078F DIAST BP <80 MM HG: CPT | Performed by: NURSE PRACTITIONER

## 2023-11-13 PROCEDURE — 99203 OFFICE O/P NEW LOW 30 MIN: CPT | Performed by: NURSE PRACTITIONER

## 2023-11-13 PROCEDURE — 99211 OFF/OP EST MAY X REQ PHY/QHP: CPT | Performed by: NURSE PRACTITIONER

## 2023-11-13 RX ORDER — IBUPROFEN 800 MG/1
TABLET ORAL
COMMUNITY
Start: 2023-09-29 | End: 2024-01-25

## 2023-11-13 RX ORDER — ZOLMITRIPTAN 5 MG/1
TABLET, ORALLY DISINTEGRATING ORAL
Qty: 9 TABLET | Refills: 5 | Status: SHIPPED | OUTPATIENT
Start: 2023-11-13

## 2023-11-13 RX ORDER — CHLORHEXIDINE GLUCONATE ORAL RINSE 1.2 MG/ML
SOLUTION DENTAL
COMMUNITY
Start: 2023-09-29 | End: 2023-12-29

## 2023-11-13 ASSESSMENT — FIBROSIS 4 INDEX: FIB4 SCORE: 0.77

## 2023-11-13 ASSESSMENT — VISUAL ACUITY: VA_NORMAL: 1

## 2023-11-13 NOTE — PROGRESS NOTES
NEUROLOGY New Consult - 11/13/2023   REFERRING PROVIDER  Janae Vega M.D.  [address]  [facility] [address]  [place] [address]    PCP  Janae Vega   [phone]   [facility] [[id number]]     REASON FOR VISIT: Corrine Guerra-Tia 53 y.o. female presents today for New Consult    SUMMARY OF PROBLEMS AND/OR DIAGNOSES AS PER MY PRIOR NOTES/ENCOUNTERS:    History of headaches since moving to the region in 2021.  She moved from the [facility] to [place] at that time.     History of headaches when she was hypertensive.  Treated with a pain reliever to treat the headaches when in the [facility].  When her BP was elevated she had a headache.    Triggers:  sleep deprivation, over worked.    Headache Description:  Aura-- none  Pain onset is sudden, intense.  8/10 onset.  Occipital region progressing to bilateral temporal pain.  Throbbing pain, like there is a painful liquid in her head.    Pain aggravated by: sun light (avoids the sun)    Headache improves with rest and staying out of the sunshine.  Acetaminophen and Tylenol PM-- sometimes improves the headache and other times about the same.  If she takes a nap the headache is usually lessened but not resolved.    Frequency: 1-2 times per week, random in nature.  Has woken up with a headache in the past few months-- very intense pain when she awoke.    Duration: 30-60 minutes    Denies nausea.    Sleep Quality: To sleep by 12pm and awake by 5am.  Naturally sleeps in.  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    Medical History: bilateral carpal tunnel surgery with release through surgeon at [facility] March and April 2023.    BP Monitoring: checks at dinner time-- Irbasartan 150mg qhs    Fish oil daily  Vit C daily  MVI daily    Patient's PMH, PSH, FH, and SH were reviewed.  ROS: All review of systems complete and are negative except as documented    CURRENT MEDICATIONS AT THE TIME OF THIS  "ENCOUNTER:    Current Outpatient Medications:     chlorhexidine, RINSE MOUTH WITH 15ML (1 CAPFUL) FOR 30 SECONDS IN MORNING AND EVENING AFTER BRUSHING, THEN SPIT, Taking    ibuprofen, TAKE 1 TABLET BY MOUTH EVERY 8 HOURS WITH FOOD AS NEEDED, Taking    Nirmatrelvir&Ritonavir 300/100, Take 300 mg nirmatrelvir (two 150 mg tablets) with 100 mg ritonavir (one 100 mg tablet) by mouth, with all three tablets taken together twice daily for 5 days., Taking    irbesartan, 150 mg, Oral, Nightly, Taking     EXAM:   Encounter Vitals  /76 (BP Location: Right arm, Patient Position: Sitting, BP Cuff Size: Adult)   Pulse 89   Temp 36.8 °C (98.3 °F) (Temporal)   Ht 1.575 m (5' 2\")   Wt 84.2 kg (185 lb 10 oz)   SpO2 95%            Physical Exam:  Physical Exam  Constitutional:       Appearance: She is well-developed. She is obese.      Comments: Naturally right-handed   HENT:      Head: Normocephalic and atraumatic.      Nose: Nose normal.   Eyes:      General: Lids are normal.      Extraocular Movements: Extraocular movements intact.      Pupils: Pupils are equal, round, and reactive to light.   Cardiovascular:      Rate and Rhythm: Normal rate and regular rhythm.   Pulmonary:      Effort: Pulmonary effort is normal.   Musculoskeletal:         General: Normal range of motion.      Cervical back: Normal range of motion.   Skin:     General: Skin is warm.   Neurological:      Mental Status: She is oriented to person, place, and time.      Motor: Motor strength is normal.No abnormal muscle tone.      Coordination: Coordination is intact.      Gait: Gait normal.      Deep Tendon Reflexes: Reflexes are normal and symmetric.      Comments: No observable changes in neurologic status.  See initial new patient examination for details.    Psychiatric:         Mood and Affect: Mood normal.        Neurological Exam   Neurological Exam  Mental Status  Awake, alert and oriented to person, place and time. Oriented to person, place and " "time. Oriented to person, place, and time. Recent and remote memory are intact.    Cranial Nerves  CN II: Visual acuity is normal. Visual fields full to confrontation.  CN III, IV, VI: Extraocular movements intact bilaterally. Normal lids and orbits bilaterally. Pupils equal round and reactive to light bilaterally.  CN V: Facial sensation is normal.  CN VII: Full and symmetric facial movement.  CN VIII: Hearing is normal.  CN IX, X: Palate elevates symmetrically. Normal gag reflex.  CN XI: Shoulder shrug strength is normal.  CN XII: Tongue midline without atrophy or fasciculations.    Motor  Normal muscle bulk throughout. Strength is 5/5 throughout all four extremities.    Sensory  Light touch is normal in upper and lower extremities.     Reflexes  Deep tendon reflexes are 2+ and symmetric in all four extremities.    Coordination    Finger-to-nose, rapid alternating movements and heel-to-shin normal bilaterally without dysmetria.    Gait  Normal casual, toe, heel and tandem gait. Normal gait.  No observable changes in neurologic status.  See initial new patient examination for details. .       ALL DATA (I.e. labs, procedures, imaging, reports, clinical notes, etc.) FROM [facility] AND/OR OUTSIDE SOURCES, IF AVAILABLE, PERSONALLY REVIEWED:      Latest Reference Range & Units 01/30/23 08:01   Glycohemoglobin 4.0 - 5.6 % 5.7 (H)   Estim. Avg Glu mg/dL 117   (H): Data is abnormally high    ASSESSMENT, EDUCATION, AND COUNSELING:  This is a 53 y.o. female patient who presents to the neurology clinic. We had an extensive discussion about the patient's symptoms, signs, and work-up to date, if any. We discussed potential and/or definitive diagnoses, work-up, and potential treatments.       PLAN:  If applicable, the work-up such as labs, imaging, procedures, and/or other testing, referrals, and/or recommended treatment strategies are listed below.    Chronic Episodic Migraine Headaches without Aura:  \"Inna\" has experienced " headaches for many years primarily related to hypertension.  After her move from the [facility] to [place], [place] in 2021 her headaches have intensified and become more frequent.  Migraine-like features occurring 1-2 times per week.      Tried and failed: none    Allergy: ASPIRIN    Orders Placed This Encounter    chlorhexidine (PERIDEX) 0.12 % Solution    ibuprofen (MOTRIN) 800 MG Tab       Discussed migraines and migraine treatment.  Discussed importance of healthy lifestyle including eating routine meals and snacks, staying well hydrated, good sleep hygiene and exercise.      Recommend abortive treatment of migraines at this time.    Discussed usage, side effects including muscle tension in neck and chest, and benefits of triptans.  She would like to proceed.  Zomig ZMT 5mg tablets prescribed as she suffers from rapid onset of pain and nausea.  Discussed appropriate dosing and usage.    Okay to combine NSAID, Motrin or ibuprofen, 600mg at onset of migraine and repeat dose every 6hours as needed.    Recommend addition of Vitamin D3 5000iU daily and Vitamin B Complex or addition of riboflavin.      BILLING DOCUMENTATION:     I spent a total of 35+ of face-to-face time in this visit. Over 50% of the time of the visit today was spent on counseling and/or coordination of care wtih the patient and/or family, as above in assessment in plan.    Sierra Shannon, MSN, FNP-BC, APRN  Department of Neurology at [facility]  Phone: [phone]  Fax: [phone]  E-mail: [email or web address]

## 2023-11-14 ENCOUNTER — TELEPHONE (OUTPATIENT)
Dept: NEUROLOGY | Facility: MEDICAL CENTER | Age: 53
End: 2023-11-14

## 2023-11-14 NOTE — TELEPHONE ENCOUNTER
Received Refill PA request via [facility]  for Zolmitriptan 5mg odt. (Quantity:9 tab Day Supply:30)     Insurance: [employer]  Member ID:  [id number]  BIN: [id number]  PCN: [id number]  Group: [id number]     Ran Test claim via [facility] & medication Pays for a $15.00 copay. Will outreach to patient to offer specialty pharmacy services and or release to preferred pharmacy

## 2023-12-21 ENCOUNTER — TELEPHONE (OUTPATIENT)
Dept: PHARMACY | Facility: MEDICAL CENTER | Age: 53
End: 2023-12-21
Payer: COMMERCIAL

## 2023-12-21 NOTE — TELEPHONE ENCOUNTER
Back to back call Attempts to contact patient at [phone] to discuss [facility] and services/benefits offered. No answer, left voicemail.    Linda Peralta  Rx Coordinator   [phone]

## 2023-12-26 NOTE — TELEPHONE ENCOUNTER
Attempted to contact patient at [phone] to discuss [facility] and services/benefits offered. No answer, left voicemail.    Linda Peralta  Rx Coordinator   [phone]

## 2023-12-27 ENCOUNTER — OFFICE VISIT (OUTPATIENT)
Dept: URGENT CARE | Facility: PHYSICIAN GROUP | Age: 53
End: 2023-12-27
Payer: COMMERCIAL

## 2023-12-27 ENCOUNTER — HOSPITAL ENCOUNTER (OUTPATIENT)
Dept: RADIOLOGY | Facility: MEDICAL CENTER | Age: 53
End: 2023-12-27
Payer: COMMERCIAL

## 2023-12-27 VITALS
SYSTOLIC BLOOD PRESSURE: 100 MMHG | TEMPERATURE: 98 F | WEIGHT: 177 LBS | HEART RATE: 94 BPM | BODY MASS INDEX: 32.57 KG/M2 | RESPIRATION RATE: 16 BRPM | HEIGHT: 62 IN | OXYGEN SATURATION: 97 % | DIASTOLIC BLOOD PRESSURE: 70 MMHG

## 2023-12-27 DIAGNOSIS — R05.1 ACUTE COUGH: ICD-10-CM

## 2023-12-27 DIAGNOSIS — J21.0 RSV (ACUTE BRONCHIOLITIS DUE TO RESPIRATORY SYNCYTIAL VIRUS): ICD-10-CM

## 2023-12-27 LAB
FLUAV RNA SPEC QL NAA+PROBE: NEGATIVE
FLUBV RNA SPEC QL NAA+PROBE: NEGATIVE
RSV RNA SPEC QL NAA+PROBE: POSITIVE
SARS-COV-2 RNA RESP QL NAA+PROBE: NEGATIVE

## 2023-12-27 PROCEDURE — 0241U POCT CEPHEID COV-2, FLU A/B, RSV - PCR: CPT

## 2023-12-27 PROCEDURE — 3074F SYST BP LT 130 MM HG: CPT

## 2023-12-27 PROCEDURE — 71046 X-RAY EXAM CHEST 2 VIEWS: CPT

## 2023-12-27 PROCEDURE — 3078F DIAST BP <80 MM HG: CPT

## 2023-12-27 PROCEDURE — 99213 OFFICE O/P EST LOW 20 MIN: CPT

## 2023-12-27 RX ORDER — CHLORAL HYDRATE 500 MG
1000 CAPSULE ORAL
COMMUNITY

## 2023-12-27 RX ORDER — MULTIVIT WITH MINERALS/LUTEIN
TABLET ORAL
COMMUNITY

## 2023-12-27 RX ORDER — BENZONATATE 100 MG/1
100 CAPSULE ORAL 3 TIMES DAILY PRN
Qty: 60 CAPSULE | Refills: 0 | Status: SHIPPED | OUTPATIENT
Start: 2023-12-27 | End: 2023-12-29

## 2023-12-27 RX ORDER — DEXTROMETHORPHAN HYDROBROMIDE AND PROMETHAZINE HYDROCHLORIDE 15; 6.25 MG/5ML; MG/5ML
5 SYRUP ORAL NIGHTLY PRN
Qty: 118 ML | Refills: 0 | Status: SHIPPED | OUTPATIENT
Start: 2023-12-27 | End: 2024-01-03

## 2023-12-27 ASSESSMENT — ENCOUNTER SYMPTOMS
STRIDOR: 0
RHINORRHEA: 1
CHILLS: 0
SPUTUM PRODUCTION: 0
SINUS PAIN: 0
WEIGHT LOSS: 0
SWEATS: 1
HEMOPTYSIS: 0
MYALGIAS: 0
HEADACHES: 1
COUGH: 1
FEVER: 0
HEARTBURN: 0
SORE THROAT: 0
WHEEZING: 0
SHORTNESS OF BREATH: 0

## 2023-12-27 ASSESSMENT — COPD QUESTIONNAIRES: COPD: 0

## 2023-12-27 ASSESSMENT — FIBROSIS 4 INDEX: FIB4 SCORE: 0.77

## 2023-12-27 NOTE — PROGRESS NOTES
Subjective:   Corrine Williamson is a very pleasant 53 y.o. female who presents for:    Chief Complaint   Patient presents with    Cough     With fever, dizziness and headache x 7 days.       HPI:    Cough  This is a new problem. Episode onset: six days ago. The cough is Non-productive. Associated symptoms include headaches, rhinorrhea and sweats. Pertinent negatives include no chest pain, chills, ear congestion, ear pain, fever, heartburn, hemoptysis, myalgias, nasal congestion, postnasal drip, rash, sore throat, shortness of breath, weight loss or wheezing. Associated symptoms comments: Lightheaded after severe coughing fit. Treatments tried: Sudafed, Tylenol, ibuprofen. The treatment provided mild relief. There is no history of asthma, bronchiectasis, bronchitis, COPD, emphysema, environmental allergies or pneumonia.       ROS:    Review of Systems   Constitutional:  Positive for malaise/fatigue. Negative for chills, fever and weight loss.   HENT:  Positive for congestion and rhinorrhea. Negative for ear discharge, ear pain, postnasal drip, sinus pain and sore throat.    Respiratory:  Positive for cough. Negative for hemoptysis, sputum production, shortness of breath, wheezing and stridor.    Cardiovascular:  Negative for chest pain.   Gastrointestinal:  Negative for heartburn.   Musculoskeletal:  Negative for myalgias.   Skin:  Negative for rash.   Neurological:  Positive for headaches.   Endo/Heme/Allergies:  Negative for environmental allergies.   All other systems reviewed and are negative.      Medications:      Current Outpatient Medications   Medication Sig    multivitamin Tab Take 1 Tablet by mouth every day.    Ascorbic Acid (VITAMIN C) 1000 MG Tab Take  by mouth.    Omega-3 Fatty Acids (FISH OIL) 1000 MG Cap capsule Take 1,000 mg by mouth 3 times a day with meals.    benzonatate (TESSALON) 100 MG Cap Take 1 Capsule by mouth 3 times a day as needed for Cough.     promethazine-dextromethorphan (PROMETHAZINE-DM) 6.25-15 MG/5ML syrup Take 5 mL by mouth at bedtime as needed for Cough for up to 7 days.    ibuprofen (MOTRIN) 800 MG Tab TAKE 1 TABLET BY MOUTH EVERY 8 HOURS WITH FOOD AS NEEDED    zolmitriptan (ZOMIG-ZMT) 5 MG disintegrating tablet Take 1/2-1 tablet po at onset of headache, may repeat dose in 2 hours if unrelieved.  Do not exceed more than 2 tablets in 24 hours.    irbesartan (AVAPRO) 150 MG Tab Take 1 Tablet by mouth every evening.    chlorhexidine (PERIDEX) 0.12 % Solution RINSE MOUTH WITH 15ML (1 CAPFUL) FOR 30 SECONDS IN MORNING AND EVENING AFTER BRUSHING, THEN SPIT (Patient not taking: Reported on 12/27/2023)       Allergies:     Allergies   Allergen Reactions    Aspirin     Aspirin        Problem List:     Patient Active Problem List   Diagnosis    TB lung, latent    Obesity (BMI 30-39.9)    Primary hypertension    Impaired fasting glucose    Hyperlipidemia       Surgical History:    Past Surgical History:   Procedure Laterality Date    CO NEUROPLASTY & OR TRANSPOS MEDIAN NRV CARPAL REBEKA Right 6/30/2023    Procedure: RIGHT HAND OPEN CARPAL TUNNEL RELEASE;  Surgeon: Elsie Michelle M.D.;  Location: [facility];  Service: Orthopedics    CO NEUROPLASTY & OR TRANSPOS MEDIAN NRV CARPAL REBEKA Left 4/7/2023    Procedure: LEFT HAND OPEN CARPAL TUNNEL RELEASE;  Surgeon: Elsie Michelle M.D.;  Location: [facility];  Service: Orthopedics       Past Social Hx:     Social History     Socioeconomic History    Marital status:    Tobacco Use    Smoking status: Former    Smokeless tobacco: Never   Vaping Use    Vaping Use: Never used   Substance and Sexual Activity    Alcohol use: Not Currently    Drug use: Never    Sexual activity: Not Currently   Social History Narrative    ** Merged History Encounter **             Past Family Hx:      Family History   Problem Relation Age of Onset    Diabetes Father      Diabetes Sister     Heart Disease Father     Hypertension Father     Diabetes Sister     Hypertension Sister     Hypertension Brother     Lung Cancer Paternal Uncle     Diabetes Paternal Grandmother        Problem list, medications, and allergies reviewed by myself today in Epic.     Objective:     Vitals:    12/27/23 0811   BP: 100/70   Pulse: 94   Resp: 16   Temp: 36.7 °C (98 °F)   SpO2: 97%       Physical Exam  Vitals reviewed.   Constitutional:       General: She is not in acute distress.     Appearance: Normal appearance. She is not ill-appearing, toxic-appearing or diaphoretic.   HENT:      Head: Normocephalic and atraumatic.      Right Ear: Tympanic membrane, ear canal and external ear normal.      Left Ear: Tympanic membrane, ear canal and external ear normal.      Nose: Congestion and rhinorrhea present.      Mouth/Throat:      Mouth: Mucous membranes are moist.   Eyes:      Extraocular Movements: Extraocular movements intact.      Conjunctiva/sclera: Conjunctivae normal.      Pupils: Pupils are equal, round, and reactive to light.   Cardiovascular:      Rate and Rhythm: Normal rate and regular rhythm.      Pulses: Normal pulses.      Heart sounds: Normal heart sounds.   Pulmonary:      Effort: Pulmonary effort is normal. No respiratory distress.      Breath sounds: Normal breath sounds. No stridor. No wheezing, rhonchi or rales.   Chest:      Chest wall: No tenderness.   Abdominal:      General: Abdomen is flat.      Palpations: Abdomen is soft.   Musculoskeletal:         General: Normal range of motion.      Cervical back: Normal range of motion.   Skin:     General: Skin is warm and dry.   Neurological:      General: No focal deficit present.      Mental Status: She is alert and oriented to person, place, and time.   Psychiatric:         Mood and Affect: Mood normal.         Behavior: Behavior normal.         Thought Content: Thought content normal.       X-ray images viewed and interpreted by DARLINE  confirmed by radiology:        RADIOLOGY RESULTS   DX-CHEST-2 VIEWS    Result Date: 12/27/2023 12/27/2023 8:48 AM HISTORY/REASON FOR EXAM:  Shortness of Breath TECHNIQUE/EXAM DESCRIPTION AND NUMBER OF VIEWS: Two views of the chest. COMPARISON:  1/30/2023 FINDINGS: The mediastinal and cardiac silhouette is unremarkable. The pulmonary vascularity is within normal limits. The lung parenchyma is clear. There is no significant pleural effusion. There is no visible pneumothorax. There are no acute bony abnormalities.     1.  Unremarkable two view chest.         Assessment/Plan:     Diagnosis and associated orders:     1. Viral upper respiratory tract infection  - POCT CEPHEID COV-2, FLU A/B, RSV - PCR    2. Acute cough  - DX-CHEST-2 VIEWS  - benzonatate (TESSALON) 100 MG Cap; Take 1 Capsule by mouth 3 times a day as needed for Cough.  Dispense: 60 Capsule; Refill: 0  - promethazine-dextromethorphan (PROMETHAZINE-DM) 6.25-15 MG/5ML syrup; Take 5 mL by mouth at bedtime as needed for Cough for up to 7 days.  Dispense: 118 mL; Refill: 0        Comments/MDM:     POCT RSV positive in clinic.  Patient called and a message was left on her voicemail via telephone encounter.    Patient is well-appearing in clinic with vital signs within normal limits.  No known exposure to sick contacts.  X-ray negative for acute cardiopulmonary abnormality that may be contributing to the patient's cough  May use benzonatate and Promethazine DM as needed for cough  No evidence of lower respiratory involvement on exam today      Recommend symptomatic care:  OTC second generation antihistamine daily (cetirizine, desloratadine, fexofenadine, levocetirizine, and loratadine) daily IN COMBINATION WITH:  OTC decongestant (Sudafed - Pseudoephedrine) unless contraindication in place, such as hypertension, CAD, narrow-angle glaucoma. Use with caution if the patient has a history of cardiac dysrhythmias, hyperthyroidism, DM, prostatic hypertrophy, and  glaucoma should use with caution.  Intranasal fluticasone (Flonase) daily  Nasal saline rinses 2-3 times a day   May use short term nasal sprays, such as oxymetazoline (Afrin) to help relieve nasal discomfort, congestion, and/or pressure. Decongestant sprays should not be used longer than three consecutive days.   Nasal rinsing with saline nasal spray or salt water (e.g., neti pot) can help relieve nasal dryness.  Breathe Right nasal strips at night for nasal congestion  Ponaris nasal emmollient for nasal congestion, dryness, and inflammation (do not use with iodine sensitivity)  Cool mist humidification, chest rubs, warm tea with honey, increased fluid intake to thin secretions  Tylenol or ibuprofen as needed for fever control, body aches, and headaches.    If sore throat is present:   Warm salt water gargles, over-the-counter throat sprays, rest, hydration with frozen (eg, ice or popsicles) or warmed liquids, herbal tea containing licorice root, elm inner bark, marshmallow root, and licorice root aqueous dry extract, Cepacol lozenges, soft diet, honey, vitamin C, zinc lozenges, and elderberry supplements.  Return precautions discussed with patient.  Patient verbalized understanding and is in agreement with this plan of care.          All questions answered. Patient verbalized understanding and is in agreement with this plan of care.     If symptoms are worsening or not improving in 3-5 days, follow-up with PCP or return to UC. Differential diagnosis, natural history, and supportive care discussed. AVS handout given and reviewed with patient. Patient educated on red flags and when to seek treatment back in ED or UC.     I personally reviewed prior external notes and test results pertinent to today's visit.  I have independently reviewed and interpreted all diagnostics ordered during this urgent care visit.     This dictation has been created using voice recognition software. The accuracy of the dictation is limited  by the abilities of the software. I expect there may be some errors of grammar and possibly content. I made every attempt to manually correct the errors within my dictation. However, errors related to voice recognition software may still exist and should be interpreted within the appropriate context.    This note was electronically signed by JAVIER Villalpando

## 2023-12-27 NOTE — PATIENT INSTRUCTIONS
"As we discussed your clinical condition would benefit from over-the-counter remedies:    FLONASE (once per day)  You may consider intranasal steroids such as fluticasone (brand name Flonase), (other options include Nasonex, Rhinocort, Nasacort) daily; continue for at least 2-3 weeks. Any generic should work as well but may cause slightly more nasal irritation. Please take according to package directions.  This steroid will help reduce inflammation of your sinuses.  This is the number one medication to help with seasonal allergies and treat nasal inflammation.  Cost: around $8 at [facility] for the generic fluticasone [facility] product (as of 5/20).    ANTIHISTAMINES  You may take a non-sedating antihistamine like Zyrtec (cetirizine) , Allegra (fexofenadine), Xyzal (levocetirizine), or Claritin (loratadine).  This will help \"dry you out\" and reduce the amount of nasal inflammation.  Follow package directions paying attention to whether they are once or twice daily.  Note: if there is a \"D\" such colleen Allegra-D it also contains a decongestant such as sudafed.  Some patients benefit from alternating these medications every few weeks but literature does not support this.   Cost: around $11 at [employer] for the generic cetirizine [facility] branded product (as of 5/20)    SUDAFED (low dose to see if tolerated)  You may consider over-the-counter Sudafed (pseudoephedrine) to act as a decongestant to improve your breathing through your nose.  Please do not use this medication if you already have known high blood pressure.  Please take according to package directions and note that this has a stimulating effect and should not be taken late in the day.  There is a low dose 12 hour formulation and a higher dose 24 hour formulation.  Start with a low dose to make sure you tolerate the medication.  Do not take late in the day as it may interfere with sleep.   Cost: Around $6 for the generic [facility] branded product at a 10mg dose (as of " "2/2023).      BENZOCAINE DROPS  These contain the active ingredient benzocaine which is an anesthetic agent.  It helps relieve the symptoms of sore throat and may diminish your cough reflex.  Take according to package directions.  The brand name CEPACOL but the generic will suffice.  Please note that taking too frequently may cause harm - pay attention to package directions.  Cost: around $4 at [employer] for Chloroseptic drops (15 count) (as of 2/2023).      SALINE IRRIGATION/SPRAY  You may consider using a saline nasal irrigation (such as a \"Neti pot\" or similar device using sterile water, NOT tap water) or OTC saline nasal spray      NSAIDs  You may take Ibuprofen (brand name Motrin or Advil) may be taken 800 mg up to three times per day taken with food (do not exceed 2400 mg per day).  If you prefer you may consider Naproxen (brand name Naprosyn or Aleve) around 500 mg twice per day with food (do not exceed 1200 mg per day). Please do not take if you have known stomach ulcers or known kidney disease.     TYLENOL  You may take Tylenol according to package directions (1000 mg every 8 hours not to exceed 3000 mg per day).  Please do not consume with any alcohol products, or if you have known or suspected liver disease. These will help reduce inflammation, help with pain control, and can reduce fevers.     "

## 2023-12-27 NOTE — LETTER
December 27, 2023    To Whom It May Concern:         This is confirmation that Corrine MENESES Eroscas-Tia attended her scheduled appointment with JAVIER Graham on 12/27/23. Please excuse her from work on 12/27 - 12/29 due to an acute illness.         If you have any questions please do not hesitate to call me at the phone number listed below.    Sincerely,          Lila Pastrana A.P.R.N.  [phone]

## 2023-12-29 ENCOUNTER — OFFICE VISIT (OUTPATIENT)
Dept: MEDICAL GROUP | Facility: MEDICAL CENTER | Age: 53
End: 2023-12-29
Payer: COMMERCIAL

## 2023-12-29 VITALS
WEIGHT: 173.8 LBS | HEART RATE: 96 BPM | BODY MASS INDEX: 31.98 KG/M2 | OXYGEN SATURATION: 96 % | SYSTOLIC BLOOD PRESSURE: 124 MMHG | DIASTOLIC BLOOD PRESSURE: 68 MMHG | HEIGHT: 62 IN | TEMPERATURE: 97.6 F

## 2023-12-29 DIAGNOSIS — Z11.4 ENCOUNTER FOR SCREENING FOR HIV: ICD-10-CM

## 2023-12-29 DIAGNOSIS — Z12.31 SCREENING MAMMOGRAM FOR BREAST CANCER: ICD-10-CM

## 2023-12-29 DIAGNOSIS — B33.8 RSV (RESPIRATORY SYNCYTIAL VIRUS INFECTION): ICD-10-CM

## 2023-12-29 DIAGNOSIS — R73.01 IMPAIRED FASTING GLUCOSE: ICD-10-CM

## 2023-12-29 DIAGNOSIS — E66.9 OBESITY (BMI 30-39.9): ICD-10-CM

## 2023-12-29 DIAGNOSIS — E78.5 HYPERLIPIDEMIA, UNSPECIFIED HYPERLIPIDEMIA TYPE: ICD-10-CM

## 2023-12-29 PROCEDURE — 3078F DIAST BP <80 MM HG: CPT

## 2023-12-29 PROCEDURE — 3074F SYST BP LT 130 MM HG: CPT

## 2023-12-29 PROCEDURE — 99213 OFFICE O/P EST LOW 20 MIN: CPT

## 2023-12-29 ASSESSMENT — FIBROSIS 4 INDEX: FIB4 SCORE: 0.77

## 2023-12-29 NOTE — PROGRESS NOTES
"Chief Complaint   Patient presents with    Establish Care     Pt states she has been having headaches and dizziness. Pt states she has had a cough since December 20th. Pt states she tested positive for RSV on the same day.        HPI: Patient is a 53 y.o. female complaining of 9 days of illness including: dry cough, fever.   Mucus is: thin.  Similarly ill exposures: no.  Treatments tried: sudafed, promethazine-dextromethorphan and benzonatate  She  reports that she has quit smoking. She has never used smokeless tobacco..     ROS:  No fever, cough, nausea, changes in bowel movements or skin rash.      I reviewed the patient's medications, allergies and medical history:  Current Outpatient Medications   Medication Sig Dispense Refill    multivitamin Tab Take 1 Tablet by mouth every day.      Ascorbic Acid (VITAMIN C) 1000 MG Tab Take  by mouth.      Omega-3 Fatty Acids (FISH OIL) 1000 MG Cap capsule Take 1,000 mg by mouth 3 times a day with meals.      promethazine-dextromethorphan (PROMETHAZINE-DM) 6.25-15 MG/5ML syrup Take 5 mL by mouth at bedtime as needed for Cough for up to 7 days. 118 mL 0    ibuprofen (MOTRIN) 800 MG Tab TAKE 1 TABLET BY MOUTH EVERY 8 HOURS WITH FOOD AS NEEDED      zolmitriptan (ZOMIG-ZMT) 5 MG disintegrating tablet Take 1/2-1 tablet po at onset of headache, may repeat dose in 2 hours if unrelieved.  Do not exceed more than 2 tablets in 24 hours. 9 Tablet 5    irbesartan (AVAPRO) 150 MG Tab Take 1 Tablet by mouth every evening. 90 Tablet 3     No current facility-administered medications for this visit.     Aspirin and Aspirin  Past Medical History:   Diagnosis Date    Hypertension     Diagnosed in 2021        EXAM:  /68   Pulse 96   Temp 36.4 °C (97.6 °F) (Temporal)   Ht 1.575 m (5' 2\")   Wt 78.8 kg (173 lb 12.8 oz)   SpO2 96%   General: Alert, no conversational dyspnea or audible wheeze, non-toxic appearance.  Eyes: PERRL, conjunctiva slightly injected, no eye discharge.  Ears: " Normal pinnae,TM's normal bilaterally.  Nares: Patent with thin mucus. Congestion, rhinorrhea.  Sinuses: non tender over maxillary / frontal sinuses.  Throat: Erythematous injection without exudate.   Neck: Supple, with no adenopathy.  Lungs: Clear to auscultation bilaterally, no wheeze, crackles or rhonchi.   Heart: Regular rate without murmur.  Skin: Warm and dry without rash.   Neurological: positive for Headaches. Currently being treated by a neurologist.    ASSESSMENT:   1. RSV (respiratory syncytial virus infection)    2. Impaired fasting glucose    3. Hyperlipidemia, unspecified hyperlipidemia type    4. Encounter for screening for HIV    5. Screening mammogram for breast cancer    6. Obesity (BMI 30-39.9)          PLAN:  1. Educated patient that majority of upper respiratory infections are viral and do not need antibiotics.  2. Twice daily use of nasal saline rinse or Neti-Pot.  3. OTC anti-pyretics and decongestants as needed.  4. Follow-up in office or urgent care for worsening symptoms, difficulty breathing, lack of expected recovery, or should new symptoms or problems arise.  5. Weight loss options, dietary changes discussed with patient.

## 2024-01-05 DIAGNOSIS — R05.1 ACUTE COUGH: ICD-10-CM

## 2024-01-05 DIAGNOSIS — B33.8 RSV (RESPIRATORY SYNCYTIAL VIRUS INFECTION): ICD-10-CM

## 2024-01-05 RX ORDER — DEXTROMETHORPHAN HYDROBROMIDE AND PROMETHAZINE HYDROCHLORIDE 15; 6.25 MG/5ML; MG/5ML
5 SYRUP ORAL EVERY 4 HOURS PRN
Qty: 180 ML | Refills: 0 | Status: SHIPPED | OUTPATIENT
Start: 2024-01-05 | End: 2024-01-17 | Stop reason: SDUPTHER

## 2024-01-17 DIAGNOSIS — B33.8 RSV (RESPIRATORY SYNCYTIAL VIRUS INFECTION): ICD-10-CM

## 2024-01-17 DIAGNOSIS — R05.1 ACUTE COUGH: ICD-10-CM

## 2024-01-17 RX ORDER — DEXTROMETHORPHAN HYDROBROMIDE AND PROMETHAZINE HYDROCHLORIDE 15; 6.25 MG/5ML; MG/5ML
5 SYRUP ORAL EVERY 4 HOURS PRN
Qty: 180 ML | Refills: 0 | Status: SHIPPED | OUTPATIENT
Start: 2024-01-17

## 2024-01-24 NOTE — PROGRESS NOTES
[facility]  GENERAL NEUROLOGY  NEW PATIENT VISIT    Referral source: Janae Vega MD    CC: chronic non-intractable headache, unspecified headache type    HISTORY OF ILLNESS:  Corrine Williamson is a 53 y.o. woman with a history most notable for headache.She is a former patient of Annmarie GREGORIO. She has had a history of headaches since moving from the [facility] to [place] in 2021. Her headaches were associated with her hypertension. She reports that 10 years ago she would have episodes of vertigo which has resolved since moving to the .  Today, Corrine provided the following history:    Headache description:  Headaches start in the bilateral occipital region. Does not radiate. Quality is described as a heavy pressure. Pain is rated 8/10. She prescribed zolmitriptan dissolvable tablets and it cause severe dizziness and she had to stay in bed all day. Will take tylenol pm 1 tablet at night when she has headache. With tylenol pm within 30 minutes pain will go away. Headache will usually last for an hour.  She will usually get this headache twice a week. Sometimes she can start a headache with coughing or sneezing.Denies automonic symptoms. Headaches occur in the morning or at night before bed. However, can occur randomly throughout the day. Triggers: blood pressure, lack of sleep, maybe dehydration. She stated that she caught RSV in December and has noticed an increase in headaches associated with her coughing.    The following is a summary of headache symptoms, presented in my standard format:    Family History: none  Age at onset (years): Approximately 50 years old  Location: see above  Radiation: see above  Frequency: see above  Duration: see above  Headache Days/Month: December 10/30. January 6+/25  Quality: see above  Intensity: see above  Aura: see above  Photophobia/Phonophobia/Nausea/Vomiting: no/no/no/no  Provoked by Physical Activity?: no  Triggers: see above  Associated  Symptoms: see above  Autonomic Signs (such as ptosis, miosis, conjunctival injection, rhinorrhea, increased lacrimation): no  Head Trauma: no  Association with Menses: yes  ED Visits: no  Hospitalizations: no  Missed Work Days (): no  Sleep (hours/night): 5 hours   Caffeine Intake: soda once weekly  Hydration: yes  Nutrition: skips breaks but snacks  Exercise:   Analgesic Overuse: tylenol one tablet twice week    Current Medication Regimen:  - Tylenol or tylenol PM      Medications Tried: Response  Preventive:  -     Rescue:  - Zolmitriptan ODT    Medications Not Tried:  -     MEDICAL AND SURGICAL HISTORY:  Past Medical History:   Diagnosis Date    Hypertension     Diagnosed in 2021     Past Surgical History:   Procedure Laterality Date    DC NEUROPLASTY & OR TRANSPOS MEDIAN NRV CARPAL REBEKA Right 6/30/2023    Procedure: RIGHT HAND OPEN CARPAL TUNNEL RELEASE;  Surgeon: Elsie Michelle M.D.;  Location: [facility];  Service: Orthopedics    DC NEUROPLASTY & OR TRANSPOS MEDIAN NRV CARPAL REBEKA Left 4/7/2023    Procedure: LEFT HAND OPEN CARPAL TUNNEL RELEASE;  Surgeon: Elsie Michelle M.D.;  Location: [facility];  Service: Orthopedics     MEDICATIONS:  Current Outpatient Medications   Medication Sig    promethazine-dextromethorphan (PROMETHAZINE-DM) 6.25-15 MG/5ML syrup Take 5 mL by mouth every four hours as needed for Cough.    multivitamin Tab Take 1 Tablet by mouth every day.    Ascorbic Acid (VITAMIN C) 1000 MG Tab Take  by mouth.    Omega-3 Fatty Acids (FISH OIL) 1000 MG Cap capsule Take 1,000 mg by mouth 3 times a day with meals.    ibuprofen (MOTRIN) 800 MG Tab TAKE 1 TABLET BY MOUTH EVERY 8 HOURS WITH FOOD AS NEEDED    zolmitriptan (ZOMIG-ZMT) 5 MG disintegrating tablet Take 1/2-1 tablet po at onset of headache, may repeat dose in 2 hours if unrelieved.  Do not exceed more than 2 tablets in 24 hours.    irbesartan (AVAPRO) 150 MG Tab Take 1 Tablet by mouth  "every evening.     SOCIAL HISTORY:  Social History     Tobacco Use    Smoking status: Former    Smokeless tobacco: Never   Substance Use Topics    Alcohol use: Not Currently     Social History     Social History Narrative    ** Merged History Encounter **          FAMILY HISTORY:  Family History   Problem Relation Age of Onset    Diabetes Father     Diabetes Sister     Heart Disease Father     Hypertension Father     Diabetes Sister     Hypertension Sister     Hypertension Brother     Lung Cancer Paternal Uncle     Diabetes Paternal Grandmother        Ambulatory Vitals  Encounter Vitals  Temperature: 36.3 °C (97.3 °F)  Temp src: Temporal  Blood Pressure: 116/78  Pulse: 74  Pulse Oximetry: 100 %  Weight: 82.6 kg (182 lb 1.6 oz)  Height: 157.5 cm (5' 2\")  BMI (Calculated): 33.31     REVIEW OF SYSTEMS:  A ROS was completed.  Pertinent positives and negatives were included in the HPI, above.  All other systems were reviewed and are negative.    PHYSICAL EXAM:  General/Medical:  Corrine presents clean and well-dressed.  She does not appear in any acute distress at this time.  She has no malar rash.  She reports no she has good capillary refill in all extremities.  She has palpable radial and pedal pulses.  She reports no jaw claudication or jaw pain.  She has good range of motion of her neck.  She has no upper or lower extremity edema. Auscultation of her heart revealed S1 and S2  distant but no abnormal sounds.  Vital signs are listed above and are within normal limits.    Neuro:  MENTAL STATUS: awake and alert; no deficits of speech or language; oriented to person, place, and time; affect was appropriate to situation    CRANIAL NERVES:    II: acuity: J1+/J1+, fields: intact to confrontation, pupils: 3/3 to 2/2 without a relative afferent pupillary defect, discs: partial visualization of the right disc sharp, unable to visualize the left    III/IV/VI: versions: intact without nystagmus    V: facial sensation: symmetric " to light touch    VII: facial expression: symmetric    VIII: hearing: intact to finger rub    IX/X: palate: elevates symmetrically    XI: shoulder shrug: symmetric    XII: tongue: midline    MOTOR:  - bulk: normal throughout  - tone: normal throughout  Upper Extremity Strength  (R/L)    5/5   Elbow flexion 5/5   Elbow extension 5/5   Shoulder abduction 5/5     Lower Extremity Strength  (R/L)   Hip flexion 5/5   Knee extension 5/5   Knee flexion 5/5   Ankle plantarflexion 5/5   Ankle dorsiflexion 5/5     - pronator drift: absent  - abnormal movements: none    SENSATION:  - light touch: intact  - vibration:intact  - temperature:intact  - pinprick: NT  - proprioception: NT  - Romberg: absent    COORDINATION:  - finger to nose: normal, no ataxia on exam  - finger tapping: rapid and accurate, bilaterally  - foot tapping:rapid and accurate, bilaterally    REFLEXES:  Reflex Right Left   BR 2+ 2+   Biceps 2+ 2+   Triceps 2+ 2+   Patellae 2+ 2+   Achilles 2+ 2+   Toes NT NT     GAIT:  - narrow base and normal  - heel-walk: intact  - toe-walk: intact  - tandem gait: intact    REVIEW OF IMAGING STUDIES: I reviewed the following studies:  MRI Brain:  N/A    REVIEW OF LABORATORY STUDIES:  No current pertinent labs    ASSESSMENT& PLAN:  1. Periodic headache syndrome, not intractable  Corrine presents with concerns of ongoing periodic headache.  She started having these headaches in 2021 after moving from the [facility] to the .  At first these headaches were attributed to her blood pressure being out of control, however, her blood pressure is within normal limits at this time.  She was a patient of DARLINE Keith and was given a prescription of zolmitriptan dissolvable to take with headaches.  She reports after taking it once she became profoundly dizzy and was bedridden the full day.  Her neurological exam revealed no concerning findings.  She has not had a brain MRI however there is no concern in her exam to  require 1 at this time.  Her headaches are localized to the bilateral occipital region she has no migrainous features pain and pressure are her only complaints associated with these headaches.  She does report having a recent virus and the coughing increasingly triggers these headaches.  Otherwise she denies any autonomic features.  Her headaches can go away within 30 minutes to an hour.  Sometimes 30 minutes after taking Tylenol.  She reports full resolution of headaches when they do go away but they can occur sometimes twice a week.  Most days she states she just power through them and she does okay but when they are bad she will take a Tylenol.  She states that they occur usually in the evening or in the morning when she wakes up.  Her  Rangel states that she does snore and she reports not feeling well rested when she wakes up in the morning.  She only gets 5 hours of sleep approximately having to get up multiple times to go to the bathroom.  I will refer her for sleep study to rule out sleep apnea causing these headaches.  She would like to try something to take at onset of these headaches we will try a trial of indomethacin she will take 1 capsule with food at onset of headache.  I will monitor her kidneys and liver function and repeat a CMP to do in 3 months.  She can contact me via [employer] if she has any concerns, updates or questions.  Otherwise I will have her follow-up with me in 3 months.    - Referral to Pulmonary and Sleep Medicine  - indomethacin (INDOCIN) 25 MG Cap; Take 1 Capsule by mouth one time as needed (headache) for up to 1 dose. Once at headache onset  Dispense: 30 Capsule; Refill: 1  - Comp Metabolic Panel; Future     BILLING DOCUMENTATION:   I spent 40 minutes in patient care. This includes time with chart review, pre-charting, records review, discussions with other healthcare providers, and documentation. This also includes face-to-face time with the patient for: exam review, discussion  and treatment planning.      Blaire Salas MSN APRN-CNP  [facility] [place]

## 2024-01-25 ENCOUNTER — OFFICE VISIT (OUTPATIENT)
Dept: NEUROLOGY | Facility: MEDICAL CENTER | Age: 54
End: 2024-01-25
Payer: COMMERCIAL

## 2024-01-25 ENCOUNTER — HOSPITAL ENCOUNTER (OUTPATIENT)
Dept: LAB | Facility: MEDICAL CENTER | Age: 54
End: 2024-01-25
Payer: COMMERCIAL

## 2024-01-25 VITALS
BODY MASS INDEX: 33.51 KG/M2 | OXYGEN SATURATION: 100 % | WEIGHT: 182.1 LBS | HEIGHT: 62 IN | HEART RATE: 74 BPM | SYSTOLIC BLOOD PRESSURE: 116 MMHG | TEMPERATURE: 97.3 F | DIASTOLIC BLOOD PRESSURE: 78 MMHG

## 2024-01-25 DIAGNOSIS — Z11.4 ENCOUNTER FOR SCREENING FOR HIV: ICD-10-CM

## 2024-01-25 DIAGNOSIS — E66.9 OBESITY (BMI 30-39.9): ICD-10-CM

## 2024-01-25 DIAGNOSIS — R73.01 IMPAIRED FASTING GLUCOSE: ICD-10-CM

## 2024-01-25 DIAGNOSIS — G43.C0 PERIODIC HEADACHE SYNDROME, NOT INTRACTABLE: ICD-10-CM

## 2024-01-25 DIAGNOSIS — E78.5 HYPERLIPIDEMIA, UNSPECIFIED HYPERLIPIDEMIA TYPE: ICD-10-CM

## 2024-01-25 LAB
ALBUMIN SERPL BCP-MCNC: 4.1 G/DL (ref 3.2–4.9)
ALBUMIN/GLOB SERPL: 1.2 G/DL
ALP SERPL-CCNC: 91 U/L (ref 30–99)
ALT SERPL-CCNC: 18 U/L (ref 2–50)
ANION GAP SERPL CALC-SCNC: 12 MMOL/L (ref 7–16)
AST SERPL-CCNC: 21 U/L (ref 12–45)
BILIRUB SERPL-MCNC: 0.5 MG/DL (ref 0.1–1.5)
BUN SERPL-MCNC: 12 MG/DL (ref 8–22)
CALCIUM ALBUM COR SERPL-MCNC: 8.6 MG/DL (ref 8.5–10.5)
CALCIUM SERPL-MCNC: 8.7 MG/DL (ref 8.5–10.5)
CHLORIDE SERPL-SCNC: 103 MMOL/L (ref 96–112)
CHOLEST SERPL-MCNC: 166 MG/DL (ref 100–199)
CO2 SERPL-SCNC: 25 MMOL/L (ref 20–33)
CREAT SERPL-MCNC: 0.55 MG/DL (ref 0.5–1.4)
ERYTHROCYTE [DISTWIDTH] IN BLOOD BY AUTOMATED COUNT: 43.7 FL (ref 35.9–50)
EST. AVERAGE GLUCOSE BLD GHB EST-MCNC: 120 MG/DL
GFR SERPLBLD CREATININE-BSD FMLA CKD-EPI: 109 ML/MIN/1.73 M 2
GLOBULIN SER CALC-MCNC: 3.4 G/DL (ref 1.9–3.5)
GLUCOSE SERPL-MCNC: 102 MG/DL (ref 65–99)
HBA1C MFR BLD: 5.8 % (ref 4–5.6)
HCT VFR BLD AUTO: 38.8 % (ref 37–47)
HDLC SERPL-MCNC: 34 MG/DL
HGB BLD-MCNC: 13 G/DL (ref 12–16)
HIV 1+2 AB+HIV1 P24 AG SERPL QL IA: NORMAL
LDLC SERPL CALC-MCNC: 97 MG/DL
MCH RBC QN AUTO: 29.5 PG (ref 27–33)
MCHC RBC AUTO-ENTMCNC: 33.5 G/DL (ref 32.2–35.5)
MCV RBC AUTO: 88.2 FL (ref 81.4–97.8)
PLATELET # BLD AUTO: 277 K/UL (ref 164–446)
PMV BLD AUTO: 9.8 FL (ref 9–12.9)
POTASSIUM SERPL-SCNC: 3.9 MMOL/L (ref 3.6–5.5)
PROT SERPL-MCNC: 7.5 G/DL (ref 6–8.2)
RBC # BLD AUTO: 4.4 M/UL (ref 4.2–5.4)
SODIUM SERPL-SCNC: 140 MMOL/L (ref 135–145)
TRIGL SERPL-MCNC: 176 MG/DL (ref 0–149)
TSH SERPL DL<=0.005 MIU/L-ACNC: 2.23 UIU/ML (ref 0.38–5.33)
WBC # BLD AUTO: 7.4 K/UL (ref 4.8–10.8)

## 2024-01-25 PROCEDURE — 99215 OFFICE O/P EST HI 40 MIN: CPT

## 2024-01-25 PROCEDURE — 36415 COLL VENOUS BLD VENIPUNCTURE: CPT

## 2024-01-25 PROCEDURE — 99212 OFFICE O/P EST SF 10 MIN: CPT

## 2024-01-25 PROCEDURE — 80061 LIPID PANEL: CPT

## 2024-01-25 PROCEDURE — 85027 COMPLETE CBC AUTOMATED: CPT

## 2024-01-25 PROCEDURE — 83036 HEMOGLOBIN GLYCOSYLATED A1C: CPT

## 2024-01-25 PROCEDURE — 84443 ASSAY THYROID STIM HORMONE: CPT

## 2024-01-25 PROCEDURE — 87389 HIV-1 AG W/HIV-1&-2 AB AG IA: CPT

## 2024-01-25 PROCEDURE — 80053 COMPREHEN METABOLIC PANEL: CPT

## 2024-01-25 RX ORDER — INDOMETHACIN 25 MG/1
25 CAPSULE ORAL
Qty: 30 CAPSULE | Refills: 1 | Status: SHIPPED | OUTPATIENT
Start: 2024-01-25

## 2024-01-25 ASSESSMENT — FIBROSIS 4 INDEX: FIB4 SCORE: 0.77

## 2024-01-25 ASSESSMENT — PATIENT HEALTH QUESTIONNAIRE - PHQ9: CLINICAL INTERPRETATION OF PHQ2 SCORE: 0

## 2024-02-16 ENCOUNTER — TELEPHONE (OUTPATIENT)
Dept: HEALTH INFORMATION MANAGEMENT | Facility: OTHER | Age: 54
End: 2024-02-16
Payer: COMMERCIAL

## 2024-03-24 DIAGNOSIS — G43.C0 PERIODIC HEADACHE SYNDROME, NOT INTRACTABLE: ICD-10-CM

## 2024-03-25 ENCOUNTER — TELEPHONE (OUTPATIENT)
Dept: NEUROLOGY | Facility: MEDICAL CENTER | Age: 54
End: 2024-03-25
Payer: COMMERCIAL

## 2024-03-25 RX ORDER — INDOMETHACIN 25 MG/1
25 CAPSULE ORAL
Qty: 30 CAPSULE | Refills: 1 | OUTPATIENT
Start: 2024-03-25

## 2024-03-25 NOTE — TELEPHONE ENCOUNTER
Received request via: Pharmacy    Medication Name/Dosage Indomethacin 25 Mg capsule    When was medication last prescribed 1.25.2024    How many refills were previously provided 1    How many Refills does he patient have left from last prescription 0    Was the patient seen in the last year in this department? Yes   Date of last office visit 1.25.2024     Per last Neurology Office Visit, when was the date of next follow up visit set for?               3 months             Date of office visit follow up request 4.25.2024     Does the patient have an upcoming appointment? Yes   If yes, when 4.23.2024             If no, schedule appointment scheduled    Does the patient have care home Plus and need 100 day supply (blood pressure, diabetes and cholesterol meds only)? Medication is not for cholesterol, blood pressure or diabetes

## 2024-03-29 ENCOUNTER — OFFICE VISIT (OUTPATIENT)
Dept: MEDICAL GROUP | Facility: MEDICAL CENTER | Age: 54
End: 2024-03-29
Payer: COMMERCIAL

## 2024-03-29 VITALS
DIASTOLIC BLOOD PRESSURE: 62 MMHG | SYSTOLIC BLOOD PRESSURE: 112 MMHG | HEART RATE: 77 BPM | WEIGHT: 179.8 LBS | HEIGHT: 62 IN | BODY MASS INDEX: 33.09 KG/M2 | OXYGEN SATURATION: 92 % | TEMPERATURE: 97 F

## 2024-03-29 DIAGNOSIS — R10.13 EPIGASTRIC PAIN: ICD-10-CM

## 2024-03-29 DIAGNOSIS — R03.0 ELEVATED BLOOD PRESSURE READING: ICD-10-CM

## 2024-03-29 ASSESSMENT — ENCOUNTER SYMPTOMS
FEVER: 0
ORTHOPNEA: 0
SHORTNESS OF BREATH: 0
CHILLS: 0
COUGH: 0
PALPITATIONS: 0

## 2024-03-29 ASSESSMENT — FIBROSIS 4 INDEX: FIB4 SCORE: 0.96

## 2024-03-29 NOTE — PROGRESS NOTES
Subjective:     Verbal consent was acquired by the patient to use [employer] ambient listening note generation during this visit Yes    CC: Follow-Up      HPI:   Corrine Martines is a 53 y.o. female who presents today for:    History of Present Illness  The patient presents for evaluation of multiple medical concerns.    Epigastric pain: She was at the ER at [facility] last night because of chest pain and backaches. Her blood pressure was too high according to the machine, 180s/90s.  She states that when when she went to take a deep breath, it was painful. It started with her back pain and it elevated into severe pain, but now she does not have it anymore. She took indomethacin before going to bed at 2:00 or 3:00 and it helped get rid of the pain.  Symptoms started yesterday, she was eating and when she stood up, she felt a sudden pain in her back. She went to [employer] and was feeling bad and it was so painful that she decided to go to the ER. The pain started right away after she ate. She describes the pain as pressure and heavy. She was eating bread and pancit when she noticed the pain. She denies any burning in her throat, nausea, vomiting, or diarrhea.         Allergies: Aspirin and Aspirin     Medications:   Current Outpatient Medications:     indomethacin (INDOCIN) 25 MG Cap, Take 1 Capsule by mouth one time as needed (headache) for up to 1 dose. Once at headache onset, Disp: 30 Capsule, Rfl: 1    promethazine-dextromethorphan (PROMETHAZINE-DM) 6.25-15 MG/5ML syrup, Take 5 mL by mouth every four hours as needed for Cough., Disp: 180 mL, Rfl: 0    multivitamin Tab, Take 1 Tablet by mouth every day., Disp: , Rfl:     Ascorbic Acid (VITAMIN C) 1000 MG Tab, Take  by mouth., Disp: , Rfl:     Omega-3 Fatty Acids (FISH OIL) 1000 MG Cap capsule, Take 1,000 mg by mouth 3 times a day with meals., Disp: , Rfl:     zolmitriptan (ZOMIG-ZMT) 5 MG disintegrating tablet, Take 1/2-1 tablet po at onset of headache, may  "repeat dose in 2 hours if unrelieved.  Do not exceed more than 2 tablets in 24 hours., Disp: 9 Tablet, Rfl: 5    irbesartan (AVAPRO) 150 MG Tab, Take 1 Tablet by mouth every evening., Disp: 90 Tablet, Rfl: 3      ROS:  Review of Systems   Constitutional:  Negative for chills and fever.   Respiratory:  Negative for cough and shortness of breath.    Cardiovascular:  Negative for chest pain, palpitations, orthopnea and leg swelling.       Objective:     Exam:  /62   Pulse 77   Temp 36.1 °C (97 °F) (Temporal)   Ht 1.575 m (5' 2\")   Wt 81.6 kg (179 lb 12.8 oz)   LMP  (LMP Unknown)   SpO2 92%   BMI 32.89 kg/m²  Body mass index is 32.89 kg/m².    Physical Exam  Constitutional:       Appearance: Normal appearance.   Eyes:      Pupils: Pupils are equal, round, and reactive to light.   Cardiovascular:      Rate and Rhythm: Normal rate and regular rhythm.      Pulses: Normal pulses.      Heart sounds: Normal heart sounds.   Pulmonary:      Effort: Pulmonary effort is normal.      Breath sounds: Normal breath sounds.   Abdominal:      General: Bowel sounds are normal.      Palpations: Abdomen is soft.   Neurological:      Mental Status: She is alert and oriented to person, place, and time.   Psychiatric:         Mood and Affect: Mood normal.         Behavior: Behavior normal.           Assessment & Plan:     Corrine Bobbi a 53 y.o. female with the following -     Assessment & Plan    1. Epigastric pain  2. Elevated blood pressure reading  Undiagnosed problem with uncertain prognosis  Her blood pressure looks great today, I think that her blood pressure being elevated was secondary to her pain.  We discussed that I do not think that her pain is heart related. I think it is more digestive related. She can try Tylenol or ibuprofen if the pain comes back first. If it is not helping, she can try the indomethacin, but I would not continuously do that. She was advised to stay hydrated. If the pain comes back, she " will let me know and we will do an ultrasound of her gallbladder, concern for possible cholelithiasis.  Patient currently not having pain at this time.        Anticipatory guidance included the following: Patient counseled about skin care, diet, supplements, smoking, drugs/alcohol use, safe sex and exercise.     Return in about 3 months (around 6/29/2024).    Please note that this dictation was created using voice recognition software. I have made every reasonable attempt to correct obvious errors, but I expect that there are errors of grammar and possibly content that I did not discover before finalizing the note.

## 2024-04-16 RX ORDER — INDOMETHACIN 25 MG/1
25 CAPSULE ORAL
Qty: 30 CAPSULE | Refills: 1 | Status: SHIPPED | OUTPATIENT
Start: 2024-04-16

## 2024-04-19 NOTE — PROGRESS NOTES
[facility]  GENERAL NEUROLOGY  FOLLOW UP VISIT    HISTORY OF ILLNESS:  Corrine Williamson is a 53 y.o. woman with a history most notable for headache.She is a former patient of Annmarie GREGORIO. She has had a history of headaches since moving from the [facility] to [place] in 2021. Her headaches were associated with her hypertension. She reports that 10 years ago she would have episodes of vertigo which has resolved since moving to the .  Today, Corrine provided the following history:  Interm History:  4/23/24-     Below is a description from 1/25/24  Headache description:  Headaches start in the bilateral occipital region. Does not radiate. Quality is described as a heavy pressure. Pain is rated 8/10. She prescribed zolmitriptan dissolvable tablets and it cause severe dizziness and she had to stay in bed all day. Will take tylenol pm 1 tablet at night when she has headache. With tylenol pm within 30 minutes pain will go away. Headache will usually last for an hour.  She will usually get this headache twice a week. Sometimes she can start a headache with coughing or sneezing.Denies automonic symptoms. Headaches occur in the morning or at night before bed. However, can occur randomly throughout the day. Triggers: blood pressure, lack of sleep, maybe dehydration. She stated that she caught RSV in December and has noticed an increase in headaches associated with her coughing.    The following is a summary of headache symptoms, presented in my standard format:    Family History: none  Age at onset (years): Approximately 50 years old  Location: see above  Radiation: see above  Frequency: see above  Duration: see above  Headache Days/Month: December 10/30. January 6+/25  Quality: see above  Intensity: see above  Aura: see above  Photophobia/Phonophobia/Nausea/Vomiting: no/no/no/no  Provoked by Physical Activity?: no  Triggers: see above  Associated Symptoms: see above  Autonomic Signs (such  as ptosis, miosis, conjunctival injection, rhinorrhea, increased lacrimation): no  Head Trauma: no  Association with Menses: yes  ED Visits: no  Hospitalizations: no  Missed Work Days (): no  Sleep (hours/night): 5 hours   Caffeine Intake: soda once weekly  Hydration: yes  Nutrition: skips breaks but snacks  Exercise:   Analgesic Overuse: tylenol one tablet twice week    Current Medication Regimen:  - Tylenol or tylenol PM      Medications Tried: Response  Preventive:  -     Rescue:  - Zolmitriptan ODT    Medications Not Tried:  -     MEDICAL AND SURGICAL HISTORY:  Past Medical History:   Diagnosis Date    Hypertension     Diagnosed in 2021     Past Surgical History:   Procedure Laterality Date    AK NEUROPLASTY & OR TRANSPOS MEDIAN NRV CARPAL REBEKA Right 6/30/2023    Procedure: RIGHT HAND OPEN CARPAL TUNNEL RELEASE;  Surgeon: Elsie Michelle M.D.;  Location: [facility];  Service: Orthopedics    AK NEUROPLASTY & OR TRANSPOS MEDIAN NRV CARPAL REBEKA Left 4/7/2023    Procedure: LEFT HAND OPEN CARPAL TUNNEL RELEASE;  Surgeon: Elsie Mihcelle M.D.;  Location: [facility];  Service: Orthopedics     MEDICATIONS:  Current Outpatient Medications   Medication Sig    indomethacin (INDOCIN) 25 MG Cap Take 1 Capsule by mouth one time as needed (headache) for up to 1 dose. Once at headache onset    promethazine-dextromethorphan (PROMETHAZINE-DM) 6.25-15 MG/5ML syrup Take 5 mL by mouth every four hours as needed for Cough.    multivitamin Tab Take 1 Tablet by mouth every day.    Ascorbic Acid (VITAMIN C) 1000 MG Tab Take  by mouth.    Omega-3 Fatty Acids (FISH OIL) 1000 MG Cap capsule Take 1,000 mg by mouth 3 times a day with meals.    zolmitriptan (ZOMIG-ZMT) 5 MG disintegrating tablet Take 1/2-1 tablet po at onset of headache, may repeat dose in 2 hours if unrelieved.  Do not exceed more than 2 tablets in 24 hours.    irbesartan (AVAPRO) 150 MG Tab Take 1 Tablet by mouth  every evening.     SOCIAL HISTORY:  Social History     Tobacco Use    Smoking status: Former    Smokeless tobacco: Never   Substance Use Topics    Alcohol use: Not Currently     Social History     Social History Narrative    ** Merged History Encounter **          FAMILY HISTORY:  Family History   Problem Relation Age of Onset    Diabetes Father     Diabetes Sister     Heart Disease Father     Hypertension Father     Diabetes Sister     Hypertension Sister     Hypertension Brother     Lung Cancer Paternal Uncle     Diabetes Paternal Grandmother        vitals    REVIEW OF SYSTEMS:  A ROS was completed.  Pertinent positives and negatives were included in the HPI, above.  All other systems were reviewed and are negative.    PHYSICAL EXAM:  General/Medical:  Corrine presents clean and well-dressed.  She does not appear in any acute distress at this time.  She has no malar rash.  She reports no she has good capillary refill in all extremities.  She has palpable radial and pedal pulses.  She reports no jaw claudication or jaw pain.  She has good range of motion of her neck.  She has no upper or lower extremity edema. Auscultation of her heart revealed S1 and S2  distant but no abnormal sounds.  Vital signs are listed above and are within normal limits.    Neuro:  MENTAL STATUS: awake and alert; no deficits of speech or language; oriented to person, place, and time; affect was appropriate to situation    CRANIAL NERVES:    II: acuity: J1+/J1+, fields: intact to confrontation, pupils: 3/3 to 2/2 without a relative afferent pupillary defect, discs: partial visualization of the right disc sharp, unable to visualize the left    III/IV/VI: versions: intact without nystagmus    V: facial sensation: symmetric to light touch    VII: facial expression: symmetric    VIII: hearing: intact to finger rub    IX/X: palate: elevates symmetrically    XI: shoulder shrug: symmetric    XII: tongue: midline    MOTOR:  - bulk: normal  throughout  - tone: normal throughout  Upper Extremity Strength  (R/L)    5/5   Elbow flexion 5/5   Elbow extension 5/5   Shoulder abduction 5/5     Lower Extremity Strength  (R/L)   Hip flexion 5/5   Knee extension 5/5   Knee flexion 5/5   Ankle plantarflexion 5/5   Ankle dorsiflexion 5/5     - pronator drift: absent  - abnormal movements: none    SENSATION:  - light touch: intact  - vibration:intact  - temperature:intact  - pinprick: NT  - proprioception: NT  - Romberg: absent    COORDINATION:  - finger to nose: normal, no ataxia on exam  - finger tapping: rapid and accurate, bilaterally  - foot tapping:rapid and accurate, bilaterally    REFLEXES:  Reflex Right Left   BR 2+ 2+   Biceps 2+ 2+   Triceps 2+ 2+   Patellae 2+ 2+   Achilles 2+ 2+   Toes NT NT     GAIT:  - narrow base and normal  - heel-walk: intact  - toe-walk: intact  - tandem gait: intact    REVIEW OF IMAGING STUDIES: I reviewed the following studies:  MRI Brain:  N/A    REVIEW OF LABORATORY STUDIES:  No current pertinent labs    ASSESSMENT& PLAN:

## 2024-04-23 ENCOUNTER — APPOINTMENT (OUTPATIENT)
Dept: NEUROLOGY | Facility: MEDICAL CENTER | Age: 54
End: 2024-04-23
Payer: COMMERCIAL

## 2024-05-08 ENCOUNTER — HOSPITAL ENCOUNTER (OUTPATIENT)
Dept: RADIOLOGY | Facility: MEDICAL CENTER | Age: 54
End: 2024-05-08
Payer: COMMERCIAL

## 2024-05-08 DIAGNOSIS — Z12.31 SCREENING MAMMOGRAM FOR BREAST CANCER: ICD-10-CM

## 2024-06-28 ENCOUNTER — OFFICE VISIT (OUTPATIENT)
Dept: MEDICAL GROUP | Facility: MEDICAL CENTER | Age: 54
End: 2024-06-28
Payer: COMMERCIAL

## 2024-06-28 VITALS
SYSTOLIC BLOOD PRESSURE: 126 MMHG | BODY MASS INDEX: 34.27 KG/M2 | WEIGHT: 186.2 LBS | DIASTOLIC BLOOD PRESSURE: 74 MMHG | HEIGHT: 62 IN | TEMPERATURE: 97.2 F | OXYGEN SATURATION: 95 % | HEART RATE: 75 BPM

## 2024-06-28 DIAGNOSIS — E66.9 OBESITY (BMI 30-39.9): ICD-10-CM

## 2024-06-28 DIAGNOSIS — R51.9 GENERALIZED HEADACHES: ICD-10-CM

## 2024-06-28 DIAGNOSIS — I10 PRIMARY HYPERTENSION: ICD-10-CM

## 2024-06-28 DIAGNOSIS — R04.0 BLEEDING FROM THE NOSE: ICD-10-CM

## 2024-06-28 ASSESSMENT — ENCOUNTER SYMPTOMS
PALPITATIONS: 0
ORTHOPNEA: 0
SHORTNESS OF BREATH: 0
FEVER: 0
COUGH: 0
CHILLS: 0

## 2024-06-28 ASSESSMENT — FIBROSIS 4 INDEX: FIB4 SCORE: 0.96

## 2024-06-28 NOTE — PROGRESS NOTES
Subjective:     Verbal consent was acquired by the patient to use [facility] ambient listening note generation during this visit Yes    CC: Follow-Up      HPI:   Corrine Martines is a 53 y.o. female who presents today for:    History of Present Illness  The patient presents for evaluation of multiple medical concerns.    The patient reports experiencing headaches, predominantly in the morning and before bedtime, for the past month. These headaches were initially managed with Indocin 25 mg, which subsequently improved her sleep quality. She discontinued the use of Zomig due to its ineffectiveness and associated dizziness. The frequency of her headaches has decreased recently, occurring only on several different days last month. She speculates that her headaches may be triggered by exposure to heat and the cessation of air conditioner at her workplace. She maintains a high water intake.    Last month, she had nosebleeds when she sneezed. This occurred for a few days and then stopped.    She states that she has gained a little weight, but plans to start the [facility] Diet on 7/1 as this has helped her lose weight in the past.          Allergies: Aspirin and Aspirin     Medications:   Current Outpatient Medications:     indomethacin (INDOCIN) 25 MG Cap, Take 1 Capsule by mouth one time as needed (headache) for up to 1 dose. Once at headache onset, Disp: 30 Capsule, Rfl: 1    multivitamin Tab, Take 1 Tablet by mouth every day., Disp: , Rfl:     Ascorbic Acid (VITAMIN C) 1000 MG Tab, Take  by mouth., Disp: , Rfl:     Omega-3 Fatty Acids (FISH OIL) 1000 MG Cap capsule, Take 1,000 mg by mouth 3 times a day with meals., Disp: , Rfl:     irbesartan (AVAPRO) 150 MG Tab, Take 1 Tablet by mouth every evening., Disp: 90 Tablet, Rfl: 3      ROS:  Review of Systems   Constitutional:  Negative for chills and fever.   Respiratory:  Negative for cough and shortness of breath.    Cardiovascular:  Negative for chest pain,  "palpitations, orthopnea and leg swelling.       Objective:     Exam:  /74   Pulse 75   Temp 36.2 °C (97.2 °F) (Temporal)   Ht 1.575 m (5' 2\")   Wt 84.5 kg (186 lb 3.2 oz)   LMP  (LMP Unknown)   SpO2 95%   BMI 34.06 kg/m²  Body mass index is 34.06 kg/m².    Physical Exam  Constitutional:       Appearance: Normal appearance.   Eyes:      Pupils: Pupils are equal, round, and reactive to light.   Cardiovascular:      Rate and Rhythm: Normal rate and regular rhythm.      Pulses: Normal pulses.      Heart sounds: Normal heart sounds.   Pulmonary:      Effort: Pulmonary effort is normal.      Breath sounds: Normal breath sounds.   Abdominal:      General: Bowel sounds are normal.      Palpations: Abdomen is soft.   Neurological:      Mental Status: She is alert and oriented to person, place, and time.   Psychiatric:         Mood and Affect: Mood normal.         Behavior: Behavior normal.           Assessment & Plan:     Corrine Martines a 53 y.o. female with the following -     1. Generalized headaches  Acute, uncomplicated  Continuation of Indocin has been advised. The patient has been advised to monitor potential triggers, such as stress, lack of sleep, and changes in caffeine intake. Encouragement was given to maintain adequate hydration. Ok to use indocin as needed for pain management.   - indomethacin (INDOCIN) 25 MG Cap, Take 1 Capsule by mouth one time as needed (headache) for up to 1 dose. Once at headache onset, Disp: 30 Capsule, Rfl: 1    2. Obesity (BMI 30-39.9)  Chronic, stable  - Patient identified as having weight management issue.  Appropriate orders and counseling given.    3. Primary hypertension  Chronic, stable  The patient's blood pressure is well-regulated.    -  irbesartan (AVAPRO) 150 MG Tab, Take 1 Tablet by mouth every evening., Disp: 90 Tablet, Rfl: 3    4. Bleeding from the nose  Acute, uncomplicated  The patient has been advised to use a humidifier in her room, apply Vaseline or " Aquaphor on the inside of her nose using a Q-tip, and to use cotton swab under the upper lip, cold compress, or Afrin spray.        Anticipatory guidance included the following: Patient counseled about skin care, diet, supplements, smoking, drugs/alcohol use, safe sex and exercise.     Return in about 3 months (around 9/28/2024), or if symptoms worsen or fail to improve.    Please note that this dictation was created using voice recognition software. I have made every reasonable attempt to correct obvious errors, but I expect that there are errors of grammar and possibly content that I did not discover before finalizing the note.

## 2024-08-24 ENCOUNTER — PATIENT MESSAGE (OUTPATIENT)
Dept: NEUROLOGY | Facility: MEDICAL CENTER | Age: 54
End: 2024-08-24
Payer: COMMERCIAL

## 2024-08-24 DIAGNOSIS — G43.C0 PERIODIC HEADACHE SYNDROME, NOT INTRACTABLE: ICD-10-CM

## 2024-08-27 RX ORDER — INDOMETHACIN 25 MG/1
25 CAPSULE ORAL
Qty: 30 CAPSULE | Refills: 3 | Status: SHIPPED | OUTPATIENT
Start: 2024-08-27

## 2024-09-27 ENCOUNTER — OFFICE VISIT (OUTPATIENT)
Dept: MEDICAL GROUP | Facility: MEDICAL CENTER | Age: 54
End: 2024-09-27
Payer: COMMERCIAL

## 2024-09-27 VITALS
HEIGHT: 62 IN | SYSTOLIC BLOOD PRESSURE: 116 MMHG | HEART RATE: 72 BPM | DIASTOLIC BLOOD PRESSURE: 88 MMHG | BODY MASS INDEX: 34.19 KG/M2 | TEMPERATURE: 97.1 F | WEIGHT: 185.8 LBS | OXYGEN SATURATION: 97 %

## 2024-09-27 DIAGNOSIS — E66.9 OBESITY (BMI 30-39.9): ICD-10-CM

## 2024-09-27 DIAGNOSIS — R51.9 GENERALIZED HEADACHES: ICD-10-CM

## 2024-09-27 DIAGNOSIS — E78.5 HYPERLIPIDEMIA, UNSPECIFIED HYPERLIPIDEMIA TYPE: ICD-10-CM

## 2024-09-27 DIAGNOSIS — R73.01 IMPAIRED FASTING GLUCOSE: ICD-10-CM

## 2024-09-27 DIAGNOSIS — I10 PRIMARY HYPERTENSION: ICD-10-CM

## 2024-09-27 DIAGNOSIS — G47.00 INSOMNIA, UNSPECIFIED TYPE: ICD-10-CM

## 2024-09-27 PROCEDURE — 3074F SYST BP LT 130 MM HG: CPT

## 2024-09-27 PROCEDURE — 99214 OFFICE O/P EST MOD 30 MIN: CPT

## 2024-09-27 PROCEDURE — 3079F DIAST BP 80-89 MM HG: CPT

## 2024-09-27 RX ORDER — TRAZODONE HYDROCHLORIDE 50 MG/1
50 TABLET, FILM COATED ORAL NIGHTLY
Qty: 30 TABLET | Refills: 3 | Status: SHIPPED | OUTPATIENT
Start: 2024-09-27

## 2024-09-27 ASSESSMENT — ENCOUNTER SYMPTOMS
CHILLS: 0
PALPITATIONS: 0
SHORTNESS OF BREATH: 0
FEVER: 0
ORTHOPNEA: 0
COUGH: 0

## 2024-09-27 ASSESSMENT — FIBROSIS 4 INDEX: FIB4 SCORE: 0.96

## 2024-09-27 NOTE — PROGRESS NOTES
"Subjective:     Verbal consent was acquired by the patient to use [email or web address] ambient listening note generation during this visit Yes    CC: Follow-Up      HPI:   Corrine Martines is a 53 y.o. female who presents today for:    History of Present Illness  The patient presents for evaluation of headaches. She is accompanied by an adult male.    She reports experiencing headaches approximately 2 to 3 times per week. To manage these, she takes a medication referred to as the \"brain pill\" in the evenings, which also aids her sleep.     However, she mentions that she occasionally wakes up in the middle of the night, around 3:33 am, and struggles to fall back asleep. This occurs about once a week. She has tried using melatonin to regulate her sleep cycle, but it has not been effective.  She believes that she may have tried Unisom, but does not recall how effective this was for her.    Blood pressure is well-controlled today, 116/88.    She and her  have been working on their weight.  They were on a diet and she was losing weight but then they went on vacation, and have not been able to go back to their diet.  She states they are hoping to do this soon.         Allergies: Aspirin and Aspirin     Medications:   Current Outpatient Medications:     traZODone (DESYREL) 50 MG Tab, Take 1 Tablet by mouth every evening., Disp: 30 Tablet, Rfl: 3    indomethacin (INDOCIN) 25 MG Cap, Take 1 Capsule by mouth one time as needed (headache) for up to 1 dose. Once at headache onset, Disp: 30 Capsule, Rfl: 3    multivitamin Tab, Take 1 Tablet by mouth every day., Disp: , Rfl:     Ascorbic Acid (VITAMIN C) 1000 MG Tab, Take  by mouth., Disp: , Rfl:     Omega-3 Fatty Acids (FISH OIL) 1000 MG Cap capsule, Take 1,000 mg by mouth 3 times a day with meals., Disp: , Rfl:     irbesartan (AVAPRO) 150 MG Tab, Take 1 Tablet by mouth every evening., Disp: 90 Tablet, Rfl: 3      ROS:  Review of Systems   Constitutional:  Negative for chills " "and fever.   Respiratory:  Negative for cough and shortness of breath.    Cardiovascular:  Negative for chest pain, palpitations, orthopnea and leg swelling.       Objective:     Exam:  /88   Pulse 72   Temp 36.2 °C (97.1 °F) (Temporal)   Ht 1.575 m (5' 2\")   Wt 84.3 kg (185 lb 12.8 oz)   LMP  (LMP Unknown)   SpO2 97%   BMI 33.98 kg/m²  Body mass index is 33.98 kg/m².    Physical Exam  Constitutional:       Appearance: Normal appearance.   Eyes:      Pupils: Pupils are equal, round, and reactive to light.   Cardiovascular:      Rate and Rhythm: Normal rate and regular rhythm.      Pulses: Normal pulses.      Heart sounds: Normal heart sounds.   Pulmonary:      Effort: Pulmonary effort is normal.      Breath sounds: Normal breath sounds.   Abdominal:      General: Bowel sounds are normal.      Palpations: Abdomen is soft.   Neurological:      Mental Status: She is alert and oriented to person, place, and time.   Psychiatric:         Mood and Affect: Mood normal.         Behavior: Behavior normal.           Assessment & Plan:     Corrine Martines a 53 y.o. female with the following -       1. Generalized headaches  Chronic, unstable  She reports experiencing headaches two to three times a week. Currently, she takes a brain pill in the evening to help with sleep, but it does not fully alleviate her symptoms. She seldom uses Tylenol or ibuprofen during the day. A regimen of riboflavin, CoQ10, and magnesium was recommended to potentially alleviate her headaches.     2. Insomnia, unspecified type  Chronic, unstable  She has difficulty falling back asleep after waking up at night, occurring about once a week. Melatonin was suggested but found ineffective. Unisom was recommended as an over-the-counter option to help with sleep.  Will send in prescription for trazodone.  - traZODone (DESYREL) 50 MG Tab; Take 1 Tablet by mouth every evening.  Dispense: 30 Tablet; Refill: 3    3. Primary hypertension  Chronic, " stable  Blood pressure well-controlled.  Continue irbesartan nightly.    4. Impaired fasting glucose  Chronic, stable  Continue to work on diet and exercise to help with blood sugar control.  - HEMOGLOBIN A1C; Future    5. Hyperlipidemia, unspecified hyperlipidemia type  Chronic, stable  Continue to work on diet and exercise to help with cholesterol management.  - Lipid Profile; Future    6. Obesity (BMI 30-39.9)  Chronic, stable  -Patient identified as having weight management problem.  Appropriate orders and counseling provided.  - HEMOGLOBIN A1C; Future  - TSH WITH REFLEX TO FT4; Future  - Comp Metabolic Panel; Future  - Lipid Profile; Future  - CBC WITHOUT DIFFERENTIAL; Future        Anticipatory guidance included the following: Patient counseled about skin care, diet, supplements, smoking, drugs/alcohol use, safe sex and exercise.     Return in about 4 months (around 1/27/2025), or if symptoms worsen or fail to improve.    Please note that this dictation was created using voice recognition software. I have made every reasonable attempt to correct obvious errors, but I expect that there are errors of grammar and possibly content that I did not discover before finalizing the note.

## 2024-09-27 NOTE — PATIENT INSTRUCTIONS
Here are the supplements I recommend for migraine prophylaxis:  - Riboflavin vitamin B 400 mg daily  - CoQ-10 100 mg twice daily  - Magnesium citrate 600 mg daily

## 2024-11-02 ENCOUNTER — HOSPITAL ENCOUNTER (OUTPATIENT)
Dept: LAB | Facility: MEDICAL CENTER | Age: 54
End: 2024-11-02
Payer: COMMERCIAL

## 2024-11-02 DIAGNOSIS — R73.01 IMPAIRED FASTING GLUCOSE: ICD-10-CM

## 2024-11-02 DIAGNOSIS — E78.5 HYPERLIPIDEMIA, UNSPECIFIED HYPERLIPIDEMIA TYPE: ICD-10-CM

## 2024-11-02 DIAGNOSIS — E66.9 OBESITY (BMI 30-39.9): ICD-10-CM

## 2024-11-02 LAB
ALBUMIN SERPL BCP-MCNC: 4.2 G/DL (ref 3.2–4.9)
ALBUMIN/GLOB SERPL: 1.2 G/DL
ALP SERPL-CCNC: 97 U/L (ref 30–99)
ALT SERPL-CCNC: 36 U/L (ref 2–50)
ANION GAP SERPL CALC-SCNC: 13 MMOL/L (ref 7–16)
AST SERPL-CCNC: 24 U/L (ref 12–45)
BILIRUB SERPL-MCNC: 0.5 MG/DL (ref 0.1–1.5)
BUN SERPL-MCNC: 15 MG/DL (ref 8–22)
CALCIUM ALBUM COR SERPL-MCNC: 9 MG/DL (ref 8.5–10.5)
CALCIUM SERPL-MCNC: 9.2 MG/DL (ref 8.5–10.5)
CHLORIDE SERPL-SCNC: 102 MMOL/L (ref 96–112)
CHOLEST SERPL-MCNC: 179 MG/DL (ref 100–199)
CO2 SERPL-SCNC: 22 MMOL/L (ref 20–33)
CREAT SERPL-MCNC: 0.64 MG/DL (ref 0.5–1.4)
ERYTHROCYTE [DISTWIDTH] IN BLOOD BY AUTOMATED COUNT: 43 FL (ref 35.9–50)
EST. AVERAGE GLUCOSE BLD GHB EST-MCNC: 131 MG/DL
GFR SERPLBLD CREATININE-BSD FMLA CKD-EPI: 104 ML/MIN/1.73 M 2
GLOBULIN SER CALC-MCNC: 3.4 G/DL (ref 1.9–3.5)
GLUCOSE SERPL-MCNC: 139 MG/DL (ref 65–99)
HBA1C MFR BLD: 6.2 % (ref 4–5.6)
HCT VFR BLD AUTO: 41.3 % (ref 37–47)
HDLC SERPL-MCNC: 39 MG/DL
HGB BLD-MCNC: 13.6 G/DL (ref 12–16)
LDLC SERPL CALC-MCNC: 107 MG/DL
MCH RBC QN AUTO: 28.8 PG (ref 27–33)
MCHC RBC AUTO-ENTMCNC: 32.9 G/DL (ref 32.2–35.5)
MCV RBC AUTO: 87.5 FL (ref 81.4–97.8)
PLATELET # BLD AUTO: 334 K/UL (ref 164–446)
PMV BLD AUTO: 9.7 FL (ref 9–12.9)
POTASSIUM SERPL-SCNC: 4 MMOL/L (ref 3.6–5.5)
PROT SERPL-MCNC: 7.6 G/DL (ref 6–8.2)
RBC # BLD AUTO: 4.72 M/UL (ref 4.2–5.4)
SODIUM SERPL-SCNC: 137 MMOL/L (ref 135–145)
TRIGL SERPL-MCNC: 164 MG/DL (ref 0–149)
TSH SERPL DL<=0.005 MIU/L-ACNC: 2.65 UIU/ML (ref 0.38–5.33)
WBC # BLD AUTO: 6.4 K/UL (ref 4.8–10.8)

## 2024-11-02 PROCEDURE — 85027 COMPLETE CBC AUTOMATED: CPT

## 2024-11-02 PROCEDURE — 36415 COLL VENOUS BLD VENIPUNCTURE: CPT

## 2024-11-02 PROCEDURE — 80061 LIPID PANEL: CPT

## 2024-11-02 PROCEDURE — 83036 HEMOGLOBIN GLYCOSYLATED A1C: CPT

## 2024-11-02 PROCEDURE — 80053 COMPREHEN METABOLIC PANEL: CPT

## 2024-11-02 PROCEDURE — 84443 ASSAY THYROID STIM HORMONE: CPT

## 2024-11-10 DIAGNOSIS — G47.00 INSOMNIA, UNSPECIFIED TYPE: ICD-10-CM

## 2024-11-11 RX ORDER — TRAZODONE HYDROCHLORIDE 50 MG/1
50 TABLET, FILM COATED ORAL EVERY EVENING
Qty: 90 TABLET | Refills: 3 | Status: SHIPPED | OUTPATIENT
Start: 2024-11-11

## 2024-11-11 NOTE — TELEPHONE ENCOUNTER
Received request via: Pharmacy    Was the patient seen in the last year in this department? Yes    Does the patient have an active prescription (recently filled or refills available) for medication(s) requested? No    Pharmacy Name: [facility]    Does the patient have jail Plus and need 100-day supply? (This applies to ALL medications) Patient does not have SCP

## 2024-11-15 DIAGNOSIS — I10 PRIMARY HYPERTENSION: ICD-10-CM

## 2024-11-15 RX ORDER — IRBESARTAN 150 MG/1
150 TABLET ORAL NIGHTLY
Qty: 90 TABLET | Refills: 3 | Status: SHIPPED | OUTPATIENT
Start: 2024-11-15

## 2024-12-26 DIAGNOSIS — G43.C0 PERIODIC HEADACHE SYNDROME, NOT INTRACTABLE: ICD-10-CM

## 2024-12-26 RX ORDER — INDOMETHACIN 25 MG/1
25 CAPSULE ORAL
Qty: 30 CAPSULE | Refills: 3 | Status: SHIPPED | OUTPATIENT
Start: 2024-12-26

## 2024-12-26 NOTE — TELEPHONE ENCOUNTER
Received request via: Pharmacy    Medication Name/Dosage INDOMETHACIN 25 MG CAPSULE     When was medication last prescribed 08/27/2024    How many refills were previously provided 3    How many Refills does he patient have left from last prescription 0    Was the patient seen in the last year in this department? Yes   Date of last office visit 01/25/2024     Per last Neurology Office Visit, when was the date of next follow up visit set for?                            Date of office visit follow up request 3 months     Does the patient have an upcoming appointment? No   If yes, when none             If no, schedule appointment Left voicemail, also sent to scheduling    Does the patient have snf Plus and need 100 day supply (blood pressure, diabetes and cholesterol meds only)? Patient does not have SCP

## 2025-01-03 ENCOUNTER — OFFICE VISIT (OUTPATIENT)
Dept: MEDICAL GROUP | Facility: MEDICAL CENTER | Age: 55
End: 2025-01-03
Payer: COMMERCIAL

## 2025-01-03 VITALS
OXYGEN SATURATION: 98 % | SYSTOLIC BLOOD PRESSURE: 118 MMHG | HEIGHT: 62 IN | WEIGHT: 182.4 LBS | DIASTOLIC BLOOD PRESSURE: 72 MMHG | BODY MASS INDEX: 33.57 KG/M2 | HEART RATE: 78 BPM | TEMPERATURE: 97.1 F

## 2025-01-03 DIAGNOSIS — E66.9 OBESITY (BMI 30-39.9): ICD-10-CM

## 2025-01-03 DIAGNOSIS — R73.03 PREDIABETES: ICD-10-CM

## 2025-01-03 DIAGNOSIS — E78.5 HYPERLIPIDEMIA, UNSPECIFIED HYPERLIPIDEMIA TYPE: ICD-10-CM

## 2025-01-03 DIAGNOSIS — R51.9 GENERALIZED HEADACHES: ICD-10-CM

## 2025-01-03 DIAGNOSIS — R29.818 SUSPECTED SLEEP APNEA: ICD-10-CM

## 2025-01-03 PROCEDURE — 99214 OFFICE O/P EST MOD 30 MIN: CPT

## 2025-01-03 PROCEDURE — 3078F DIAST BP <80 MM HG: CPT

## 2025-01-03 PROCEDURE — 3074F SYST BP LT 130 MM HG: CPT

## 2025-01-03 RX ORDER — METFORMIN HYDROCHLORIDE 500 MG/1
500 TABLET, EXTENDED RELEASE ORAL DAILY
Qty: 90 TABLET | Refills: 3 | Status: SHIPPED | OUTPATIENT
Start: 2025-01-03

## 2025-01-03 RX ORDER — ROSUVASTATIN CALCIUM 10 MG/1
10 TABLET, COATED ORAL EVERY EVENING
Qty: 90 TABLET | Refills: 3 | Status: SHIPPED | OUTPATIENT
Start: 2025-01-03

## 2025-01-03 ASSESSMENT — ENCOUNTER SYMPTOMS
SHORTNESS OF BREATH: 0
CHILLS: 0
ORTHOPNEA: 0
PALPITATIONS: 0
COUGH: 0
FEVER: 0

## 2025-01-03 ASSESSMENT — FIBROSIS 4 INDEX: FIB4 SCORE: 0.66

## 2025-01-03 ASSESSMENT — PATIENT HEALTH QUESTIONNAIRE - PHQ9: CLINICAL INTERPRETATION OF PHQ2 SCORE: 0

## 2025-01-03 NOTE — PROGRESS NOTES
Subjective:     Verbal consent was acquired by the patient to use [employer] ambient listening note generation during this visit Yes    CC: Follow-Up      HPI:   Corrine Martines is a 54 y.o. female who presents today for:    History of Present Illness  The patient presents for evaluation of elevated cholesterol, prediabetes, headaches, and aspirin allergy.    She reports that her recent laboratory results indicate elevated triglycerides and blood glucose levels. She has been supplementing with omega-3 fatty acids. She was previously on rosuvastatin when she lived in the [facility], which she tolerated well without any adverse effects. She is not currently taking this medication, but is open to going back on it.     She experiences headaches 2 to 3 times per week, varying in intensity. These headaches typically occur upon awakening in the morning or after a daytime nap. She has not yet undergone evaluation for sleep apnea, as recommended by Dr. Salas last year, but she never completed this test. She occasionally snores and often feels fatigued during the day. She finds relief from her headaches with indomethacin, which also aids her in falling asleep.    Her A1C was elevated at 6.2. She is open to dietary modifications as an alternative approach to managing her elevated blood glucose levels. She is agreeable to medication as well, although a little hesitant to start metformin due to her family history of diabetes and having to take this medication. She does not consume sweets but acknowledges a high intake of rice in her diet.             Allergies: Aspirin and Aspirin     Medications:   Current Outpatient Medications:     rosuvastatin (CRESTOR) 10 MG Tab, Take 1 Tablet by mouth every evening., Disp: 90 Tablet, Rfl: 3    metFORMIN ER (GLUCOPHAGE XR) 500 MG TABLET SR 24 HR, Take 1 Tablet by mouth every day., Disp: 90 Tablet, Rfl: 3    indomethacin (INDOCIN) 25 MG Cap, TAKE 1 CAPSULE BY MOUTH ONE TIME AS  "NEEDED (HEADACHE) FOR UP TO 1 DOSE. ONCE AT HEADACHE ONSET, Disp: 30 Capsule, Rfl: 3    irbesartan (AVAPRO) 150 MG Tab, Take 1 Tablet by mouth every evening., Disp: 90 Tablet, Rfl: 3    traZODone (DESYREL) 50 MG Tab, TAKE 1 TABLET BY MOUTH EVERY DAY IN THE EVENING, Disp: 90 Tablet, Rfl: 3    multivitamin Tab, Take 1 Tablet by mouth every day., Disp: , Rfl:     Ascorbic Acid (VITAMIN C) 1000 MG Tab, Take  by mouth., Disp: , Rfl:     Omega-3 Fatty Acids (FISH OIL) 1000 MG Cap capsule, Take 1,000 mg by mouth 3 times a day with meals., Disp: , Rfl:       ROS:  Review of Systems   Constitutional:  Negative for chills and fever.   Respiratory:  Negative for cough and shortness of breath.    Cardiovascular:  Negative for chest pain, palpitations, orthopnea and leg swelling.       Objective:     Exam:  /72   Pulse 78   Temp 36.2 °C (97.1 °F) (Temporal)   Ht 1.575 m (5' 2\")   Wt 82.7 kg (182 lb 6.4 oz)   LMP  (LMP Unknown)   SpO2 98%   BMI 33.36 kg/m²  Body mass index is 33.36 kg/m².    Physical Exam  Constitutional:       Appearance: Normal appearance.   Eyes:      Pupils: Pupils are equal, round, and reactive to light.   Cardiovascular:      Rate and Rhythm: Normal rate and regular rhythm.      Pulses: Normal pulses.      Heart sounds: Normal heart sounds.   Pulmonary:      Effort: Pulmonary effort is normal.      Breath sounds: Normal breath sounds.   Abdominal:      General: Bowel sounds are normal.      Palpations: Abdomen is soft.   Neurological:      Mental Status: She is alert and oriented to person, place, and time.   Psychiatric:         Mood and Affect: Mood normal.         Behavior: Behavior normal.           Assessment & Plan:     Corrine Martines a 54 y.o. female with the following -     Assessment & Plan    1. Hyperlipidemia, unspecified hyperlipidemia type  Chronic, unstable  Her cholesterol levels have shown a slight increase. She will be reinitiated on rosuvastatin therapy to manage her " cholesterol levels.  - rosuvastatin (CRESTOR) 10 MG Tab; Take 1 Tablet by mouth every evening.  Dispense: 90 Tablet; Refill: 3  - Lipid Profile; Future    2. Prediabetes  Chronic, unstable  Her blood glucose levels have escalated to 6.2, placing her in the prediabetic range. Dietary modifications were discussed, including reducing carbohydrate intake, less sugars, less breads, rice, pastas, and cutting back on sodas. She was advised to consume rice that has been refrigerated overnight and reheated, as this may help control blood sugar spikes. The potential side effects of metformin, such as gastrointestinal upset, nausea, and diarrhea, were discussed. She was advised to start with a low dose of metformin extended release, taking it every other day initially and gradually increasing to daily intake over 1 to 2 weeks. She can take it at lunch if she does not eat breakfast. She will commence metformin therapy to aid in blood sugar control and prevent the progression to diabetes.  - metFORMIN ER (GLUCOPHAGE XR) 500 MG TABLET SR 24 HR; Take 1 Tablet by mouth every day.  Dispense: 90 Tablet; Refill: 3  - HEMOGLOBIN A1C; Future    3. Obesity (BMI 30-39.9)  Chronic, unstable  - patient identified as having a weight management problem. Appropriate orders and concealing provided.   - TSH WITH REFLEX TO FT4; Future  - Comp Metabolic Panel; Future  - CBC WITHOUT DIFFERENTIAL; Future    4. Generalized headaches  Chronic, unstable  She scored a 4 on the STOPBANG tool indicating a potential for sleep apnea. She was advised to continue her current regimen of Tylenol and ibuprofen and indomethacin for headache management. A referral will be made to [facility] for a home sleep study to evaluate for potential sleep apnea. She will follow up with Dr. Salas to assess the need for any adjustments in her headache management plan.  - Referral to Pulmonary and Sleep Medicine    5. Suspected sleep apnea  Undiagnosed problem  with uncertain prognosis  A referral will be made to [facility] for a home sleep study to evaluate for potential sleep apnea.   - Referral to Pulmonary and Sleep Medicine        Anticipatory guidance included the following: Patient counseled about skin care, diet, supplements, smoking, drugs/alcohol use, safe sex and exercise.     Return in about 3 months (around 4/3/2025).    Please note that this dictation was created using voice recognition software. I have made every reasonable attempt to correct obvious errors, but I expect that there are errors of grammar and possibly content that I did not discover before finalizing the note.

## 2025-01-29 ENCOUNTER — OFFICE VISIT (OUTPATIENT)
Dept: URGENT CARE | Facility: PHYSICIAN GROUP | Age: 55
End: 2025-01-29
Payer: COMMERCIAL

## 2025-01-29 VITALS
SYSTOLIC BLOOD PRESSURE: 112 MMHG | HEART RATE: 106 BPM | TEMPERATURE: 99.2 F | WEIGHT: 180 LBS | HEIGHT: 62 IN | BODY MASS INDEX: 33.13 KG/M2 | RESPIRATION RATE: 18 BRPM | DIASTOLIC BLOOD PRESSURE: 60 MMHG | OXYGEN SATURATION: 96 %

## 2025-01-29 DIAGNOSIS — R05.1 ACUTE COUGH: ICD-10-CM

## 2025-01-29 DIAGNOSIS — J10.1 INFLUENZA A: ICD-10-CM

## 2025-01-29 LAB
FLUAV RNA SPEC QL NAA+PROBE: POSITIVE
FLUBV RNA SPEC QL NAA+PROBE: NEGATIVE
RSV RNA SPEC QL NAA+PROBE: NEGATIVE
SARS-COV-2 RNA RESP QL NAA+PROBE: NEGATIVE

## 2025-01-29 PROCEDURE — 3078F DIAST BP <80 MM HG: CPT | Performed by: NURSE PRACTITIONER

## 2025-01-29 PROCEDURE — 99213 OFFICE O/P EST LOW 20 MIN: CPT | Performed by: NURSE PRACTITIONER

## 2025-01-29 PROCEDURE — 3074F SYST BP LT 130 MM HG: CPT | Performed by: NURSE PRACTITIONER

## 2025-01-29 PROCEDURE — 0241U POCT CEPHEID COV-2, FLU A/B, RSV - PCR: CPT | Performed by: NURSE PRACTITIONER

## 2025-01-29 RX ORDER — OSELTAMIVIR PHOSPHATE 75 MG/1
75 CAPSULE ORAL 2 TIMES DAILY
Qty: 10 CAPSULE | Refills: 0 | Status: SHIPPED | OUTPATIENT
Start: 2025-01-29 | End: 2025-02-03

## 2025-01-29 RX ORDER — DEXTROMETHORPHAN HYDROBROMIDE AND PROMETHAZINE HYDROCHLORIDE 15; 6.25 MG/5ML; MG/5ML
5 SYRUP ORAL EVERY 4 HOURS PRN
Qty: 120 ML | Refills: 0 | Status: SHIPPED | OUTPATIENT
Start: 2025-01-29

## 2025-01-29 ASSESSMENT — ENCOUNTER SYMPTOMS
COUGH: 1
SORE THROAT: 1
HEADACHES: 1
MYALGIAS: 1

## 2025-01-29 ASSESSMENT — FIBROSIS 4 INDEX: FIB4 SCORE: 0.66

## 2025-01-29 ASSESSMENT — VISUAL ACUITY: OU: 1

## 2025-01-29 NOTE — LETTER
January 29, 2025         Patient: Corrine Williamson   YOB: 1970   Date of Visit: 1/29/2025           To Whom it May Concern:    Corrine Williamson was seen in my clinic on 1/29/2025 due to illness. Due to medical necessity, please excuse patient from work 1/28/2025 through 1/31/2025.    If you have any questions or concerns, please don't hesitate to call.        Sincerely,         RUSH Rivera.  Electronically Signed

## 2025-01-30 NOTE — PROGRESS NOTES
Subjective:     Corrine Williamson is a 54 y.o. female who presents for Cough (Cough, runny nose, headache, body aches, chest discomfort. X 3 days)       Cough  This is a new problem. The problem has been gradually worsening. Associated symptoms include headaches, myalgias and a sore throat.     Ambient listening software ([employer]) used during this encounter with the consent of the patient. The following text is AI-assisted:    History of Present Illness  The patient presents with cough, rhinorrhea, headache, and body aches, accompanied by her .    Cough, Rhinorrhea, Headache, and Body Aches  She has had a persistent cough for 3 days, initially starting on Thursday, subsiding on Sunday, and recurring on Monday and Tuesday. Symptoms include rhinorrhea, severe headaches, and generalized body aches. A sore throat has resolved. She has not used OTC medications like ibuprofen or Tylenol and reports no respiratory distress.  - Onset: Cough started on Thursday, subsided on Sunday, and recurred on Monday and Tuesday.  - Duration: Persistent for 3 days.  - Character: Persistent cough, rhinorrhea, severe headaches, generalized body aches.  - Alleviating/Aggravating Factors: No use of OTC medications like ibuprofen or Tylenol.  - Timing: Cough subsided on Sunday and recurred on Monday and Tuesday.  - Severity: Severe headaches, generalized body aches, no respiratory distress.    MEDICATIONS  - Current: amoxicillin     remarks he is being treated for sinus infection.    Review of Systems   HENT:  Positive for congestion and sore throat.    Respiratory:  Positive for cough.    Musculoskeletal:  Positive for myalgias.   Neurological:  Positive for headaches.   All other systems reviewed and are negative.  Refer to HPI for additional details.    During this visit, appropriate PPE was worn, and hand hygiene was performed.    PMH:  has a past medical history of Hypertension.    MEDS:   Current  Outpatient Medications:     promethazine-dextromethorphan (PROMETHAZINE-DM) 6.25-15 MG/5ML syrup, Take 5 mL by mouth every four hours as needed for Cough., Disp: 120 mL, Rfl: 0    oseltamivir (TAMIFLU) 75 MG Cap, Take 1 Capsule by mouth 2 times a day for 5 days., Disp: 10 Capsule, Rfl: 0    rosuvastatin (CRESTOR) 10 MG Tab, Take 1 Tablet by mouth every evening., Disp: 90 Tablet, Rfl: 3    metFORMIN ER (GLUCOPHAGE XR) 500 MG TABLET SR 24 HR, Take 1 Tablet by mouth every day., Disp: 90 Tablet, Rfl: 3    indomethacin (INDOCIN) 25 MG Cap, TAKE 1 CAPSULE BY MOUTH ONE TIME AS NEEDED (HEADACHE) FOR UP TO 1 DOSE. ONCE AT HEADACHE ONSET, Disp: 30 Capsule, Rfl: 3    irbesartan (AVAPRO) 150 MG Tab, Take 1 Tablet by mouth every evening., Disp: 90 Tablet, Rfl: 3    traZODone (DESYREL) 50 MG Tab, TAKE 1 TABLET BY MOUTH EVERY DAY IN THE EVENING, Disp: 90 Tablet, Rfl: 3    multivitamin Tab, Take 1 Tablet by mouth every day., Disp: , Rfl:     Ascorbic Acid (VITAMIN C) 1000 MG Tab, Take  by mouth., Disp: , Rfl:     Omega-3 Fatty Acids (FISH OIL) 1000 MG Cap capsule, Take 1,000 mg by mouth 3 times a day with meals., Disp: , Rfl:     ALLERGIES:   Allergies   Allergen Reactions    Aspirin Rash     Full body rash     SURGHX:   Past Surgical History:   Procedure Laterality Date    NE NEUROPLASTY & OR TRANSPOS MEDIAN NRV CARPAL REBEKA Right 6/30/2023    Procedure: RIGHT HAND OPEN CARPAL TUNNEL RELEASE;  Surgeon: Elsie Michelle M.D.;  Location: [facility] [place];  Service: Orthopedics    NE NEUROPLASTY & OR TRANSPOS MEDIAN NRV CARPAL REBEKA Left 4/7/2023    Procedure: LEFT HAND OPEN CARPAL TUNNEL RELEASE;  Surgeon: Elsie Michelle M.D.;  Location: [facility] [place];  Service: Orthopedics     SOCHX:  reports that she has quit smoking. She has never used smokeless tobacco. She reports that she does not currently use alcohol. She reports that she does not use drugs.    FH: Per HPI as  "applicable/pertinent.      Objective:     /60   Pulse (!) 106   Temp 37.3 °C (99.2 °F)   Resp 18   Ht 1.575 m (5' 2\")   Wt 81.6 kg (180 lb)   LMP  (LMP Unknown)   SpO2 96%   BMI 32.92 kg/m²     Physical Exam  Nursing note reviewed.   Constitutional:       General: She is not in acute distress.     Appearance: She is well-developed. She is not ill-appearing or toxic-appearing.   HENT:      Nose: Congestion present.      Right Sinus: No maxillary sinus tenderness or frontal sinus tenderness.      Left Sinus: No maxillary sinus tenderness or frontal sinus tenderness.      Mouth/Throat:      Mouth: Mucous membranes are moist.      Pharynx: Oropharynx is clear.   Eyes:      General: Vision grossly intact.      Extraocular Movements: Extraocular movements intact.      Conjunctiva/sclera: Conjunctivae normal.   Cardiovascular:      Rate and Rhythm: Regular rhythm. Tachycardia present.      Heart sounds: Normal heart sounds.   Pulmonary:      Effort: Pulmonary effort is normal. No respiratory distress.      Breath sounds: Normal breath sounds. No stridor. No decreased breath sounds, wheezing, rhonchi or rales.   Musculoskeletal:         General: No deformity. Normal range of motion.      Cervical back: Normal range of motion.   Skin:     General: Skin is warm and dry.      Coloration: Skin is not pale.   Neurological:      Mental Status: She is alert and oriented to person, place, and time.      Motor: No weakness.   Psychiatric:         Behavior: Behavior normal. Behavior is cooperative.     POCT Cepheid CoV-2, Flu A/B, RSV by PCR: positive flu A      Assessment/Plan:     1. Acute cough  - POCT CEPHEID COV-2, FLU A/B, RSV - PCR  - promethazine-dextromethorphan (PROMETHAZINE-DM) 6.25-15 MG/5ML syrup; Take 5 mL by mouth every four hours as needed for Cough.  Dispense: 120 mL; Refill: 0    2. Influenza A  - oseltamivir (TAMIFLU) 75 MG Cap; Take 1 Capsule by mouth 2 times a day for 5 days.  Dispense: 10 Capsule; " Refill: 0    Discussed likely self-limiting viral etiology and expected course and duration of illness.    Emphasize supportive measures, rest, fluids, and symptom management with over-the-counter medication as needed.  May continue with NSAID.  Rx as above sent electronically.    Standard precautions/mask/wash hands.    Monitor. Return precautions advised.     Differential diagnosis, natural history, supportive care, over-the-counter symptom management per 's instructions, close monitoring, and indications for immediate follow-up discussed.     All questions answered. Patient/ agrees with the plan of care.    Discharge summary provided via [employer].    Work note provided.

## 2025-02-22 ENCOUNTER — HOSPITAL ENCOUNTER (OUTPATIENT)
Dept: LAB | Facility: MEDICAL CENTER | Age: 55
End: 2025-02-22
Payer: COMMERCIAL

## 2025-02-22 DIAGNOSIS — R73.03 PREDIABETES: ICD-10-CM

## 2025-02-22 DIAGNOSIS — E78.5 HYPERLIPIDEMIA, UNSPECIFIED HYPERLIPIDEMIA TYPE: ICD-10-CM

## 2025-02-22 DIAGNOSIS — E66.9 OBESITY (BMI 30-39.9): ICD-10-CM

## 2025-02-22 LAB
ALBUMIN SERPL BCP-MCNC: 4.3 G/DL (ref 3.2–4.9)
ALBUMIN/GLOB SERPL: 1.3 G/DL
ALP SERPL-CCNC: 88 U/L (ref 30–99)
ALT SERPL-CCNC: 31 U/L (ref 2–50)
ANION GAP SERPL CALC-SCNC: 11 MMOL/L (ref 7–16)
AST SERPL-CCNC: 21 U/L (ref 12–45)
BILIRUB SERPL-MCNC: 0.6 MG/DL (ref 0.1–1.5)
BUN SERPL-MCNC: 11 MG/DL (ref 8–22)
CALCIUM ALBUM COR SERPL-MCNC: 8.8 MG/DL (ref 8.5–10.5)
CALCIUM SERPL-MCNC: 9 MG/DL (ref 8.5–10.5)
CHLORIDE SERPL-SCNC: 104 MMOL/L (ref 96–112)
CHOLEST SERPL-MCNC: 98 MG/DL (ref 100–199)
CO2 SERPL-SCNC: 26 MMOL/L (ref 20–33)
CREAT SERPL-MCNC: 0.7 MG/DL (ref 0.5–1.4)
ERYTHROCYTE [DISTWIDTH] IN BLOOD BY AUTOMATED COUNT: 42.4 FL (ref 35.9–50)
EST. AVERAGE GLUCOSE BLD GHB EST-MCNC: 140 MG/DL
FASTING STATUS PATIENT QL REPORTED: NORMAL
GFR SERPLBLD CREATININE-BSD FMLA CKD-EPI: 102 ML/MIN/1.73 M 2
GLOBULIN SER CALC-MCNC: 3.2 G/DL (ref 1.9–3.5)
GLUCOSE SERPL-MCNC: 126 MG/DL (ref 65–99)
HBA1C MFR BLD: 6.5 % (ref 4–5.6)
HCT VFR BLD AUTO: 39.1 % (ref 37–47)
HDLC SERPL-MCNC: 41 MG/DL
HGB BLD-MCNC: 12.8 G/DL (ref 12–16)
LDLC SERPL CALC-MCNC: 35 MG/DL
MCH RBC QN AUTO: 29.2 PG (ref 27–33)
MCHC RBC AUTO-ENTMCNC: 32.7 G/DL (ref 32.2–35.5)
MCV RBC AUTO: 89.3 FL (ref 81.4–97.8)
PLATELET # BLD AUTO: 257 K/UL (ref 164–446)
PMV BLD AUTO: 9.7 FL (ref 9–12.9)
POTASSIUM SERPL-SCNC: 4.2 MMOL/L (ref 3.6–5.5)
PROT SERPL-MCNC: 7.5 G/DL (ref 6–8.2)
RBC # BLD AUTO: 4.38 M/UL (ref 4.2–5.4)
SODIUM SERPL-SCNC: 141 MMOL/L (ref 135–145)
TRIGL SERPL-MCNC: 108 MG/DL (ref 0–149)
TSH SERPL DL<=0.005 MIU/L-ACNC: 1.81 UIU/ML (ref 0.38–5.33)
WBC # BLD AUTO: 6.9 K/UL (ref 4.8–10.8)

## 2025-02-22 PROCEDURE — 85027 COMPLETE CBC AUTOMATED: CPT

## 2025-02-22 PROCEDURE — 80061 LIPID PANEL: CPT

## 2025-02-22 PROCEDURE — 83036 HEMOGLOBIN GLYCOSYLATED A1C: CPT

## 2025-02-22 PROCEDURE — 36415 COLL VENOUS BLD VENIPUNCTURE: CPT

## 2025-02-22 PROCEDURE — 80053 COMPREHEN METABOLIC PANEL: CPT

## 2025-02-22 PROCEDURE — 84443 ASSAY THYROID STIM HORMONE: CPT

## 2025-02-26 ENCOUNTER — RESULTS FOLLOW-UP (OUTPATIENT)
Dept: MEDICAL GROUP | Facility: MEDICAL CENTER | Age: 55
End: 2025-02-26

## 2025-05-02 ENCOUNTER — APPOINTMENT (OUTPATIENT)
Dept: MEDICAL GROUP | Facility: MEDICAL CENTER | Age: 55
End: 2025-05-02
Payer: COMMERCIAL

## 2025-05-28 ENCOUNTER — APPOINTMENT (OUTPATIENT)
Dept: MEDICAL GROUP | Facility: MEDICAL CENTER | Age: 55
End: 2025-05-28
Payer: COMMERCIAL

## 2025-05-28 VITALS
RESPIRATION RATE: 21 BRPM | HEIGHT: 62 IN | TEMPERATURE: 97.4 F | WEIGHT: 180.2 LBS | OXYGEN SATURATION: 96 % | BODY MASS INDEX: 33.16 KG/M2 | HEART RATE: 72 BPM | DIASTOLIC BLOOD PRESSURE: 74 MMHG | SYSTOLIC BLOOD PRESSURE: 112 MMHG

## 2025-05-28 DIAGNOSIS — E66.9 OBESITY (BMI 30-39.9): ICD-10-CM

## 2025-05-28 DIAGNOSIS — E78.5 HYPERLIPIDEMIA, UNSPECIFIED HYPERLIPIDEMIA TYPE: ICD-10-CM

## 2025-05-28 DIAGNOSIS — R51.9 GENERALIZED HEADACHES: ICD-10-CM

## 2025-05-28 DIAGNOSIS — E11.9 TYPE 2 DIABETES MELLITUS WITHOUT COMPLICATION, WITHOUT LONG-TERM CURRENT USE OF INSULIN (HCC): ICD-10-CM

## 2025-05-28 DIAGNOSIS — Z12.31 SCREENING MAMMOGRAM FOR BREAST CANCER: Primary | ICD-10-CM

## 2025-05-28 PROBLEM — G47.33 OBSTRUCTIVE SLEEP APNEA OF ADULT: Status: ACTIVE | Noted: 2025-02-11

## 2025-05-28 PROCEDURE — 3074F SYST BP LT 130 MM HG: CPT

## 2025-05-28 PROCEDURE — 99214 OFFICE O/P EST MOD 30 MIN: CPT

## 2025-05-28 PROCEDURE — 3078F DIAST BP <80 MM HG: CPT

## 2025-05-28 RX ORDER — SUMATRIPTAN SUCCINATE 25 MG/1
25 TABLET ORAL
Qty: 10 TABLET | Refills: 3 | Status: CANCELLED | OUTPATIENT
Start: 2025-05-28

## 2025-05-28 RX ORDER — TIRZEPATIDE 2.5 MG/.5ML
2.5 INJECTION, SOLUTION SUBCUTANEOUS
Qty: 2 ML | Refills: 6 | Status: SHIPPED | OUTPATIENT
Start: 2025-05-28

## 2025-05-28 ASSESSMENT — ENCOUNTER SYMPTOMS
ABDOMINAL PAIN: 1
COUGH: 0
PALPITATIONS: 0
ORTHOPNEA: 0
SHORTNESS OF BREATH: 0
FEVER: 0
CHILLS: 0

## 2025-05-28 ASSESSMENT — FIBROSIS 4 INDEX: FIB4 SCORE: 0.81

## 2025-05-28 NOTE — PROGRESS NOTES
"Subjective:     Verbal consent was acquired by the patient to use [employer] ambient listening note generation during this visit Yes    CC: Follow-Up (Pt states she has been  getting random headaches ever since she started taking the metformin)      HPI:   Corrine Martines is a 54 y.o. female who presents today for:    History of Present Illness  The patient presents for evaluation and management of diabetes mellitus and cephalalgia.    Diabetes Mellitus  She reports experiencing abdominal discomfort, bloating, which she attributes to her current metformin therapy. Notably, this discomfort has not been associated with diarrhea. The patient expresses interest in transitioning to an alternative medication to help mange her diabetes.   - Character: Abdominal discomfort.  - Alleviating/Aggravating Factors: Interested in transitioning to injectable antidiabetic treatment.  - Severity: Discomfort not associated with diarrhea.    Headaches   The patient experiences headaches approximately three times per week, each episode lasting about one hour. These headaches are effectively managed with indomethacin, which she administers prior to sleep. Additionally, she possesses trazodone but has not utilized it before bed.  - Onset: Approximately three times per week.  - Duration: Each episode lasts about one hour.  - Character: Headaches.  - Alleviating Factors: Managed with indomethacin administered prior to sleep.  - Severity: Effectively managed with indomethacin.         Allergies: Aspirin     Medications: Current Medications[1]      ROS:  Review of Systems   Constitutional:  Negative for chills and fever.   Respiratory:  Negative for cough and shortness of breath.    Cardiovascular:  Negative for chest pain, palpitations, orthopnea and leg swelling.   Gastrointestinal:  Positive for abdominal pain.       Objective:     Exam:  /74   Pulse 72   Temp 36.3 °C (97.4 °F) (Temporal)   Resp (!) 21   Ht 1.575 m (5' 2\")  "  Wt 81.7 kg (180 lb 3.2 oz)   LMP  (LMP Unknown)   SpO2 96%   BMI 32.96 kg/m²  Body mass index is 32.96 kg/m².    Physical Exam  Constitutional:       Appearance: Normal appearance.   Eyes:      Pupils: Pupils are equal, round, and reactive to light.   Cardiovascular:      Rate and Rhythm: Normal rate and regular rhythm.      Pulses: Normal pulses.      Heart sounds: Normal heart sounds.   Pulmonary:      Effort: Pulmonary effort is normal.      Breath sounds: Normal breath sounds.   Abdominal:      General: Bowel sounds are normal.      Palpations: Abdomen is soft.   Neurological:      Mental Status: She is alert and oriented to person, place, and time.   Psychiatric:         Mood and Affect: Mood normal.         Behavior: Behavior normal.           Assessment & Plan:     Corrine Martines a 54 y.o. female with the following -     Assessment & Plan       1. Type 2 diabetes mellitus without complication, without long-term current use of insulin (HCC)  Chronic, stable  A1c level is 6.5, indicating diabetes, and BMI qualifies for injectable medications.  Diagnostic plan: A urine test, foot examination, and eye exam will be scheduled during the next visit due to time constraints for the patient.  Treatment plan: Transition from metformin to Mounjaro injections, administered once weekly. Prescription for Mounjaro will be sent. Once approved, metformin will be discontinued.  Clinical decision making: Potential side effects of Mounjaro, including nausea, diarrhea, and constipation, were discussed. These side effects are due to the medication's mechanism of slowing gastric emptying, leading to reduced hunger and smaller meal portions.  - Tirzepatide (MOUNJARO) 2.5 MG/0.5ML Solution Auto-injector; Inject 2.5 mg under the skin every 7 days.  Dispense: 2 mL; Refill: 6  - Comp Metabolic Panel; Future  - Lipid Profile; Future    2. Generalized headaches  Chronic, stable  Experiences headaches approximately 3 times a week,  lasting about an hour.  Treatment plan: Currently managed with indomethacin, which is effective. Advised to maintain adequate hydration and sleep to prevent headaches. Encouraged to keep a headache journal to monitor any changes in frequency or severity.  Clinical decision making: If symptoms worsen or a different medication is desired, sumatriptan (Imitrex) can be considered. A referral to a neurologist specializing in headaches can also be made if needed.    3. Obesity (BMI 30-39.9)  Chronic, stable  Continue to work on diet and exercise to help with weight management.  - TSH WITH REFLEX TO FT4; Future  - CBC WITHOUT DIFFERENTIAL; Future    4. Hyperlipidemia, unspecified hyperlipidemia type  Chronic, stable  Due to recheck labs.  Continue rosuvastatin 10 mg nightly.  Continue to work on dietary modifications to help with cholesterol management.  - Lipid Profile; Future    5. Screening mammogram for breast cancer  - MA-SCREENING MAMMO BILAT W/TOMOSYNTHESIS W/CAD; Future        Anticipatory guidance included the following: Patient counseled about skin care, diet, supplements, smoking, drugs/alcohol use, safe sex and exercise.     Return in about 3 months (around 8/28/2025).    Please note that this dictation was created using voice recognition software. I have made every reasonable attempt to correct obvious errors, but I expect that there are errors of grammar and possibly content that I did not discover before finalizing the note.              [1]   Current Outpatient Medications:     Tirzepatide (MOUNJARO) 2.5 MG/0.5ML Solution Auto-injector, Inject 2.5 mg under the skin every 7 days., Disp: 2 mL, Rfl: 6    promethazine-dextromethorphan (PROMETHAZINE-DM) 6.25-15 MG/5ML syrup, Take 5 mL by mouth every four hours as needed for Cough., Disp: 120 mL, Rfl: 0    rosuvastatin (CRESTOR) 10 MG Tab, Take 1 Tablet by mouth every evening., Disp: 90 Tablet, Rfl: 3    indomethacin (INDOCIN) 25 MG Cap, TAKE 1 CAPSULE BY MOUTH  ONE TIME AS NEEDED (HEADACHE) FOR UP TO 1 DOSE. ONCE AT HEADACHE ONSET, Disp: 30 Capsule, Rfl: 3    irbesartan (AVAPRO) 150 MG Tab, Take 1 Tablet by mouth every evening., Disp: 90 Tablet, Rfl: 3    traZODone (DESYREL) 50 MG Tab, TAKE 1 TABLET BY MOUTH EVERY DAY IN THE EVENING, Disp: 90 Tablet, Rfl: 3    multivitamin Tab, Take 1 Tablet by mouth every day., Disp: , Rfl:     Ascorbic Acid (VITAMIN C) 1000 MG Tab, Take  by mouth., Disp: , Rfl:     Omega-3 Fatty Acids (FISH OIL) 1000 MG Cap capsule, Take 1,000 mg by mouth 3 times a day with meals., Disp: , Rfl:

## 2025-06-23 DIAGNOSIS — G43.C0 PERIODIC HEADACHE SYNDROME, NOT INTRACTABLE: ICD-10-CM

## 2025-06-24 RX ORDER — INDOMETHACIN 25 MG/1
25 CAPSULE ORAL
Qty: 30 CAPSULE | Refills: 3 | OUTPATIENT
Start: 2025-06-24

## 2025-07-09 ENCOUNTER — HOSPITAL ENCOUNTER (OUTPATIENT)
Facility: MEDICAL CENTER | Age: 55
End: 2025-07-09
Payer: COMMERCIAL

## 2025-07-09 ENCOUNTER — APPOINTMENT (OUTPATIENT)
Dept: MEDICAL GROUP | Facility: MEDICAL CENTER | Age: 55
End: 2025-07-09
Payer: COMMERCIAL

## 2025-07-09 VITALS
SYSTOLIC BLOOD PRESSURE: 122 MMHG | BODY MASS INDEX: 32.06 KG/M2 | HEIGHT: 62 IN | OXYGEN SATURATION: 97 % | HEART RATE: 86 BPM | WEIGHT: 174.2 LBS | DIASTOLIC BLOOD PRESSURE: 84 MMHG | RESPIRATION RATE: 14 BRPM | TEMPERATURE: 97 F

## 2025-07-09 DIAGNOSIS — E11.9 TYPE 2 DIABETES MELLITUS WITHOUT COMPLICATION, WITHOUT LONG-TERM CURRENT USE OF INSULIN (HCC): ICD-10-CM

## 2025-07-09 DIAGNOSIS — M25.562 ACUTE PAIN OF LEFT KNEE: ICD-10-CM

## 2025-07-09 DIAGNOSIS — E11.9 TYPE 2 DIABETES MELLITUS WITHOUT COMPLICATION, WITHOUT LONG-TERM CURRENT USE OF INSULIN (HCC): Primary | ICD-10-CM

## 2025-07-09 PROCEDURE — 82043 UR ALBUMIN QUANTITATIVE: CPT

## 2025-07-09 PROCEDURE — 3074F SYST BP LT 130 MM HG: CPT

## 2025-07-09 PROCEDURE — 3079F DIAST BP 80-89 MM HG: CPT

## 2025-07-09 PROCEDURE — 82570 ASSAY OF URINE CREATININE: CPT

## 2025-07-09 PROCEDURE — 99213 OFFICE O/P EST LOW 20 MIN: CPT

## 2025-07-09 ASSESSMENT — ENCOUNTER SYMPTOMS
CHILLS: 0
SHORTNESS OF BREATH: 0
FEVER: 0
COUGH: 0
ORTHOPNEA: 0
PALPITATIONS: 0

## 2025-07-09 ASSESSMENT — FIBROSIS 4 INDEX: FIB4 SCORE: 0.81

## 2025-07-09 NOTE — PROGRESS NOTES
Subjective:     Verbal consent was acquired by the patient to use [employer] ambient listening note generation during this visit Yes    CC: Follow-Up (Pt states she has been having random extreme pain in her left knee since June 8th)      HPI:   Corrine Martines is a 54 y.o. female who presents today for:    History of Present Illness  The patient presents for evaluation of gonalgia.    Knee Pain  She began experiencing knee pain on June 8, 2025, which was a novel symptom for her. The onset of the pain was acute and occurred while she was attending a social event. The pain subsided the following day but recurred one week later without any identifiable precipitating factor. She reports no associated auditory phenomena such as crepitus. The pain is described as intermittent and can be severe, but it becomes manageable with leg elevation. Currently, the pain is tolerable but exacerbates with movement or stretching. It is more pronounced during weight-bearing activities, transitioning from a seated to a standing position, and ascending stairs. 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 She reports no history of falls, trauma, or other joint pathologies. Her physical activity is limited, and she does not encounter many stairs at home or work. She has taken acetaminophen twice since the onset of the pain, each time taking two capsules. She has not utilized cryotherapy, thermotherapy, knee orthoses, or compression wraps for symptom relief.  - Onset: Acute onset on June 8, 2025, during a social event.  - Location: Knee.  - Duration: Pain subsided the following day but recurred one week later.  - Character: Intermittent, severe at times, manageable with leg elevation.  - Alleviating/Aggravating Factors: Leg elevation helps; exacerbated by movement, stretching, weight-bearing activities, transitioning from seated to standing, and ascending stairs.  - Timing: More  "pronounced during weight-bearing activities and transitioning from seated to standing.  - Severity: Tolerable currently but can be severe; significant pain when rising from a seated position.         Allergies: Aspirin     Medications: Current Medications[1]      ROS:  Review of Systems   Constitutional:  Negative for chills and fever.   Respiratory:  Negative for cough and shortness of breath.    Cardiovascular:  Negative for chest pain, palpitations, orthopnea and leg swelling.   Musculoskeletal:  Positive for joint pain.       Objective:     Exam:  /84   Pulse 86   Temp 36.1 °C (97 °F) (Temporal)   Resp 14   Ht 1.575 m (5' 2\")   Wt 79 kg (174 lb 3.2 oz)   LMP  (LMP Unknown)   SpO2 97%   BMI 31.86 kg/m²  Body mass index is 31.86 kg/m².    Physical Exam  Constitutional:       Appearance: Normal appearance.   Eyes:      Pupils: Pupils are equal, round, and reactive to light.   Cardiovascular:      Rate and Rhythm: Normal rate and regular rhythm.      Pulses: Normal pulses.      Heart sounds: Normal heart sounds.   Pulmonary:      Effort: Pulmonary effort is normal.      Breath sounds: Normal breath sounds.   Abdominal:      General: Bowel sounds are normal.      Palpations: Abdomen is soft.   Musculoskeletal:      Left knee: Swelling present. No bony tenderness. No tenderness.   Neurological:      Mental Status: She is alert and oriented to person, place, and time.   Psychiatric:         Mood and Affect: Mood normal.         Behavior: Behavior normal.           Assessment & Plan:     Corrine Bobbi a 54 y.o. female with the following -     Assessment & Plan     1. Type 2 diabetes mellitus without complication, without long-term current use of insulin (HCC)  Chronic, stable  Continue [facility] and work on diet and exercise to help manage blood sugar.  Diagnostic plan: A urine test will be conducted today to monitor her diabetes.  Treatment plan: Reviewed the need for regular monitoring and management " of diabetes. Encouraged to follow up with routine diabetes care and check-ups.  - POCT Microalbumin Creat Ratio Urine; Future  - Diabetic Monofilament LE Exam    2. Acute pain of left knee  Acute, uncomplicated  The knee pain started on 06/08/2025 and has been intermittent since then. There is no history of falls or specific injuries. The pain worsens with movement and weight-bearing activities.  Physical examination reveals swelling in the anterior and posterior parts of the knee and pain when extending the leg inward with internal rotation. Likely arthritis. No Baker cyst or popping/clicking sounds were noted.  Diagnostic plan: Referral to [facility] will be made for further evaluation and potential imaging.  Treatment plan: Discussed the use of Tylenol for pain management and applying ice to the swollen area. Elevating the leg on a pillow after work is also recommended. Physical therapy will be initiated to strengthen the muscles around the knee.  - Referral to Orthopedics  - Referral to Physical Therapy        Anticipatory guidance included the following: Patient counseled about skin care, diet, supplements, smoking, drugs/alcohol use, safe sex and exercise.     Return if symptoms worsen or fail to improve.    Please note that this dictation was created using voice recognition software. I have made every reasonable attempt to correct obvious errors, but I expect that there are errors of grammar and possibly content that I did not discover before finalizing the note.      I have placed the below orders and discussed them with an approved delegating provider.  The MA is performing the below orders under the direction of Dr. Cline.         [1]   Current Outpatient Medications:     Tirzepatide (MOUNJARO) 2.5 MG/0.5ML Solution Auto-injector, Inject 2.5 mg under the skin every 7 days., Disp: 2 mL, Rfl: 6    promethazine-dextromethorphan (PROMETHAZINE-DM) 6.25-15 MG/5ML syrup, Take 5 mL by mouth every four  hours as needed for Cough., Disp: 120 mL, Rfl: 0    rosuvastatin (CRESTOR) 10 MG Tab, Take 1 Tablet by mouth every evening., Disp: 90 Tablet, Rfl: 3    indomethacin (INDOCIN) 25 MG Cap, TAKE 1 CAPSULE BY MOUTH ONE TIME AS NEEDED (HEADACHE) FOR UP TO 1 DOSE. ONCE AT HEADACHE ONSET, Disp: 30 Capsule, Rfl: 3    irbesartan (AVAPRO) 150 MG Tab, Take 1 Tablet by mouth every evening., Disp: 90 Tablet, Rfl: 3    traZODone (DESYREL) 50 MG Tab, TAKE 1 TABLET BY MOUTH EVERY DAY IN THE EVENING, Disp: 90 Tablet, Rfl: 3    multivitamin Tab, Take 1 Tablet by mouth every day., Disp: , Rfl:     Ascorbic Acid (VITAMIN C) 1000 MG Tab, Take  by mouth., Disp: , Rfl:     Omega-3 Fatty Acids (FISH OIL) 1000 MG Cap capsule, Take 1,000 mg by mouth 3 times a day with meals., Disp: , Rfl:

## 2025-07-10 LAB
CREAT UR-MCNC: 113 MG/DL
MICROALBUMIN UR-MCNC: <1.2 MG/DL
MICROALBUMIN/CREAT UR: NORMAL MG/G (ref 0–30)

## 2025-07-14 NOTE — Clinical Note
REFERRAL APPROVAL NOTICE         Sent on July 14, 2025                   Inna Weber  [address] [facility] [place] [address]                   Dear Ms. Mari Weber,    After a careful review of the medical information and benefit coverage, [facility] has processed your referral. See below for additional details.    If applicable, you must be actively enrolled with your insurance for coverage of the authorized service. If you have any questions regarding your coverage, please contact your insurance directly.    REFERRAL INFORMATION   Referral #:  [id number]  Referred-To Provider    Referred-By Provider:  Orthopedic Surgery    JAVIER Zamudio   [facility]      [address]  [address]  [place] [address]  [phone] [address]  [facility] [address]  [phone]    Referral Start Date:  07/09/2025  Referral End Date:   07/09/2026             SCHEDULING  If you do not already have an appointment, please call [phone] to make an appointment.     MORE INFORMATION  If you do not already have a [employer] account, sign up at: [email or web address]  You can access your medical information, make appointments, see lab results, billing information, and more.  If you have questions regarding this referral, please contact  the [facility] Referrals department at:             [phone]. Monday - Friday 8:00AM - 5:00PM.     Sincerely,    [facility]

## 2025-07-23 DIAGNOSIS — G43.C0 PERIODIC HEADACHE SYNDROME, NOT INTRACTABLE: ICD-10-CM

## 2025-07-23 RX ORDER — INDOMETHACIN 25 MG/1
25 CAPSULE ORAL
Qty: 30 CAPSULE | Refills: 3 | OUTPATIENT
Start: 2025-07-23

## 2025-08-01 ENCOUNTER — HOSPITAL ENCOUNTER (OUTPATIENT)
Dept: RADIOLOGY | Facility: MEDICAL CENTER | Age: 55
End: 2025-08-01
Payer: COMMERCIAL

## 2025-08-01 VITALS — HEIGHT: 62 IN | WEIGHT: 172 LBS | BODY MASS INDEX: 31.65 KG/M2

## 2025-08-01 DIAGNOSIS — Z12.31 SCREENING MAMMOGRAM FOR BREAST CANCER: ICD-10-CM

## 2025-08-01 PROCEDURE — 77067 SCR MAMMO BI INCL CAD: CPT

## 2025-08-01 ASSESSMENT — FIBROSIS 4 INDEX: FIB4 SCORE: 0.81

## 2025-08-28 ENCOUNTER — TELEPHONE (OUTPATIENT)
Dept: NEUROLOGY | Facility: MEDICAL CENTER | Age: 55
End: 2025-08-28
Payer: COMMERCIAL

## 2025-08-28 DIAGNOSIS — G43.C0 PERIODIC HEADACHE SYNDROME, NOT INTRACTABLE: ICD-10-CM

## 2025-08-28 DIAGNOSIS — I10 PRIMARY HYPERTENSION: ICD-10-CM

## 2025-08-28 RX ORDER — IRBESARTAN 150 MG/1
150 TABLET ORAL EVERY EVENING
Qty: 90 TABLET | Refills: 3 | Status: SHIPPED | OUTPATIENT
Start: 2025-08-28

## 2025-08-28 RX ORDER — INDOMETHACIN 25 MG/1
25 CAPSULE ORAL
Qty: 30 CAPSULE | Refills: 3 | Status: SHIPPED | OUTPATIENT
Start: 2025-08-28

## 2025-08-28 RX ORDER — INDOMETHACIN 25 MG/1
25 CAPSULE ORAL
Qty: 30 CAPSULE | Refills: 3 | OUTPATIENT
Start: 2025-08-28

## 2025-09-09 ENCOUNTER — APPOINTMENT (OUTPATIENT)
Dept: MEDICAL GROUP | Facility: MEDICAL CENTER | Age: 55
End: 2025-09-09
Payer: COMMERCIAL